# Patient Record
Sex: MALE | Race: WHITE | Employment: OTHER | RURAL
[De-identification: names, ages, dates, MRNs, and addresses within clinical notes are randomized per-mention and may not be internally consistent; named-entity substitution may affect disease eponyms.]

---

## 2017-02-06 ENCOUNTER — OFFICE VISIT (OUTPATIENT)
Dept: FAMILY MEDICINE CLINIC | Age: 72
End: 2017-02-06

## 2017-02-06 VITALS
SYSTOLIC BLOOD PRESSURE: 170 MMHG | DIASTOLIC BLOOD PRESSURE: 80 MMHG | HEART RATE: 62 BPM | WEIGHT: 225 LBS | RESPIRATION RATE: 12 BRPM | OXYGEN SATURATION: 98 % | BODY MASS INDEX: 32.21 KG/M2 | HEIGHT: 70 IN | TEMPERATURE: 95.5 F

## 2017-02-06 DIAGNOSIS — N40.0 BENIGN PROSTATIC HYPERPLASIA WITHOUT LOWER URINARY TRACT SYMPTOMS, UNSPECIFIED MORPHOLOGY: ICD-10-CM

## 2017-02-06 DIAGNOSIS — R73.9 ELEVATED SERUM GLUCOSE: ICD-10-CM

## 2017-02-06 DIAGNOSIS — Z85.038 HISTORY OF COLON CANCER: ICD-10-CM

## 2017-02-06 DIAGNOSIS — I10 ESSENTIAL HYPERTENSION: Primary | ICD-10-CM

## 2017-02-06 DIAGNOSIS — E78.5 HYPERLIPIDEMIA, UNSPECIFIED HYPERLIPIDEMIA TYPE: ICD-10-CM

## 2017-02-06 RX ORDER — KETOCONAZOLE 20 MG/G
CREAM TOPICAL DAILY
COMMUNITY
End: 2017-09-05 | Stop reason: SDUPTHER

## 2017-02-06 RX ORDER — TAMSULOSIN HYDROCHLORIDE 0.4 MG/1
0.4 CAPSULE ORAL DAILY
COMMUNITY
End: 2017-10-03 | Stop reason: SDUPTHER

## 2017-02-06 RX ORDER — LISINOPRIL 40 MG/1
40 TABLET ORAL DAILY
Qty: 30 TAB | Refills: 3 | Status: SHIPPED | OUTPATIENT
Start: 2017-02-06 | End: 2017-03-08 | Stop reason: SDUPTHER

## 2017-02-06 NOTE — PATIENT INSTRUCTIONS
Continue current medications  Restart lisinopril    Work on diet and exercise    Lab work this week    Keep planned follow up visit

## 2017-02-06 NOTE — PROGRESS NOTES
Sharon Sinclair is a 66 y.o. male who presents to the office today with the following:  Chief Complaint   Patient presents with    Hypertension     recent elevation       No Known Allergies    Current Outpatient Prescriptions   Medication Sig    tamsulosin (FLOMAX) 0.4 mg capsule Take 0.4 mg by mouth daily.  ketoconazole (NIZORAL) 2 % topical cream Apply  to affected area daily.  lisinopril (PRINIVIL, ZESTRIL) 40 mg tablet Take 1 Tab by mouth daily. Indications: hypertension    aspirin 81 mg tablet Take 81 mg by mouth daily.  loperamide (IMMODIUM) 2 mg tablet Take 4 mg by mouth Before breakfast, lunch, and dinner. And with snacks     lovastatin (MEVACOR) 20 mg tablet Take 20 mg by mouth nightly. Indications: HYPERCHOLESTEROLEMIA    ascorbic acid (VITAMIN C) 1,000 mg tablet Take 1,000 mg by mouth as needed. No current facility-administered medications for this visit. Past Medical History   Diagnosis Date    Cancer [facility]) 2009     colon    Diabetes ([facility] [address]     Hypertension        Past Surgical History   Procedure Laterality Date    Hx other surgical       vasectomy    Pr abdomen surgery proc unlisted  2009     ileostomy    Hx gi  2/11/13      REVISION / REPAIR ILEOSTOMY HERNIA       History   Smoking Status    Never Smoker   Smokeless Tobacco    Never Used       No family history on file. History of Present Illness:  Patient here for initial visit, to establish care in my practice and for ongoing medical follow-up    Patient with a history of hypertension. He used to be on Norvasc and lisinopril. He had lost significant weight at that time. He began to experience some hypotension. He discussed this with his previous physician and they discontinued the medications. His blood pressure did well for several years but now over the last 6 months he has been gaining some weight and has been noticing his blood pressure has been going higher. His blood pressure is elevated today. He has no cardiac complaints. No history of renal disease, CAD. He has a history of hyperlipidemia. He was on Mevacor. He again tells me he had lost weight and his previous physician discontinued his medications. He has gained weight again. We need to check this. He has had a history of hyperglycemia/borderline diabetes. He was on Glucotrol. With the weight loss he began to experience hypoglycemia. He has been fine since stopping the medication. We do need to recheck lab work to see if he needs additional medication    He has a history of colon cancer. He had this resected over 10 years ago. He was told the resection was complete and he did not need chemotherapy. He apparently followed up with his surgeon on one occasion to have a repeat colonoscopy but they were unable to perform this successfully through the ileostomy. He was told at that time he did not need follow-up scopes. He would be willing to do Hemoccult testing    He does have BPH. He has been on Flomax from his previous physician. This seems to work well for him. Because of his previous surgery we cannot do rectal exams he tells me. I will check a PSA. Patient does have elevated BMI as noted    His past medical history is otherwise negative. He has had one pneumonia shot.   He declines a second pneumonia shot, flu shot, shingles shot, tetanus booster      Review of Systems:      Review of systems negative except as noted above    Physical Exam:  Visit Vitals    /80    Pulse 62    Temp 95.5 °F (35.3 °C) (Temporal)    Resp 12    Ht 5' 10\" (1.778 m)    Wt 225 lb (102.1 kg)    SpO2 98%    BMI 32.28 kg/m2     Vitals:    02/06/17 1541 02/06/17 1601   BP: 181/85 170/80   BP 1 Location: Right arm    BP Patient Position: Sitting    Pulse: 62    Resp: 12    Temp: 95.5 °F (35.3 °C)    TempSrc: Temporal    SpO2: 98%    Weight: 225 lb (102.1 kg)    Height: 5' 10\" (1.778 m)      Patient no acute distress vital signs repeated and as above  Head is normocephalic. He had some seborrheic keratoses noted on the scalp  External ears normal.  Ear canals normal TMs were clear. Hearing grossly normal  Eyes PERRLA. EOMs full. Sclera clear. He has seen his eye doctor recently. He has had cataract surgery  Nose within normal limits. Nares  normal.  No significant congestion  OP mucosa normal, no obvious lesions. Pharynx normal.  No erythema or exudate. Structures midline. Good dental repair  Neck no nodes no masses no bruits  Chest was clear no wheezes rhonchi or rales. Good air exchange  Cor regular rate and rhythm no S3-S4 no murmurs  Abdomen mildly obese no HSM no masses soft and was nontender. Ileostomy in place. Extremities no edema. Nontender. Good range of motion  Gait and gross neurologic exam were normal    EKG      Assessment/Plan:  1. Essential hypertension  Elevated today. Patient willing to restart medication. We will begin with lisinopril. .  Patient would like his lab work done fasting which is reasonable. He agrees to come back in later on this week. I will see him back in 1 month and to continue to adjust his medication  - lisinopril (PRINIVIL, ZESTRIL) 40 mg tablet; Take 1 Tab by mouth daily. Indications: hypertension  Dispense: 30 Tab; Refill: 3  - METABOLIC PANEL, BASIC; Future  - URINALYSIS W/ RFLX MICROSCOPIC; Future    2. Elevated serum glucose    - HEMOGLOBIN A1C WITH EAG; Future  - MICROALBUMIN, RANDOM URINE; Future    3. Hyperlipidemia, unspecified hyperlipidemia type    - LIPID PANEL; Future  - AST; Future  - ALT; Future  - TSH; Future    4. Benign prostatic hyperplasia without lower urinary tract symptoms, unspecified morphology  We will continue current medications and check PSA  - PROSTATE SPECIFIC AG; Future    5. History of colon cancer  Patient states he has decided not to have his ileostomy repaired  - FECAL BLOOD SCRN IMMUNOASSAY    6.  BMI 32.0-32.9,adult  Importance of diet and exercise stressed    Patient Instructions   Continue current medications  Restart lisinopril    Work on diet and exercise    Lab work this week    Keep planned follow up visit         Continue current therapy plan except for indicated above. Verbal and written instructions (see AVS) provided.  Patient expresses understanding of diagnosis and treatment plan. Follow-up Disposition:  Return in about 4 weeks (around 3/6/2017). Katherine Parks.  Emma Paul MD

## 2017-02-06 NOTE — MR AVS SNAPSHOT
Visit Information Date & Time Provider Department Dept. Phone Encounter #  
 2/6/2017  3:00 PM Johnathon Dorantes MD [address] [place] [address] [phone] [id number] Follow-up Instructions Return in about 4 weeks (around 3/6/2017). Upcoming Health Maintenance Date Due FOBT Q 1 YEAR AGE 50-75 9/5/1994 ZOSTER VACCINE AGE 60> 9/5/2004 GLAUCOMA SCREENING Q2Y 9/5/2009 MEDICARE YEARLY EXAM 9/5/2009 DTaP/Tdap/Td series (2 - Td) 2/6/2027 Allergies as of 2/6/2017  Review Complete On: 2/6/2017 By: Ana Ferrara LPN No Known Allergies Current Immunizations  Reviewed on 2/11/2013 Name Date Influenza Vaccine 10/11/2012 Pneumococcal Polysaccharide (PPSV-23) 2/12/2013 12:05 PM  
  
 Not reviewed this visit You Were Diagnosed With   
  
 Codes Comments Essential hypertension    -  Primary ICD-10-CM: I10 
ICD-9-CM: 401.9 Elevated serum glucose     ICD-10-CM: R73.9 ICD-9-CM: 790.29 Hyperlipidemia, unspecified hyperlipidemia type     ICD-10-CM: E78.5 ICD-9-CM: 272.4 Benign prostatic hyperplasia without lower urinary tract symptoms, unspecified morphology     ICD-10-CM: N40.0 ICD-9-CM: 600.00 History of colon cancer     ICD-10-CM: Z85.038 
ICD-9-CM: V10.05 BMI 32.0-32.9,adult     ICD-10-CM: H83.44 
ICD-9-CM: V85.32 Vitals BP Pulse Temp Resp Height(growth percentile) Weight(growth percentile) 170/80 62 95.5 °F (35.3 °C) (Temporal) 12 5' 10\" (1.778 m) 225 lb (102.1 kg) SpO2 BMI Smoking Status 98% 32.28 kg/m2 Never Smoker Vitals History BMI and BSA Data Body Mass Index Body Surface Area  
 32.28 kg/m 2 2.25 m 2 Preferred Pharmacy Pharmacy Name Phone [facility] [address] [place], [address] Your Updated Medication List  
  
   
This list is accurate as of: 2/6/17  4:17 PM.  Always use your most recent med list.  
  
  
  
  
 aspirin 81 mg tablet Take 81 mg by mouth daily. ketoconazole 2 % topical cream  
Commonly known as:  NIZORAL Apply  to affected area daily. lisinopril 40 mg tablet Commonly known as:  Rebekah Primmer Take 1 Tab by mouth daily. Indications: hypertension  
  
 loperamide 2 mg tablet Commonly known as:  IMMODIUM Take 4 mg by mouth Before breakfast, lunch, and dinner. And with snacks  
  
 lovastatin 20 mg tablet Commonly known as:  MEVACOR Take 20 mg by mouth nightly. Indications: HYPERCHOLESTEROLEMIA  
  
 tamsulosin 0.4 mg capsule Commonly known as:  FLOMAX Take 0.4 mg by mouth daily. VITAMIN C 1,000 mg tablet Generic drug:  ascorbic acid (vitamin C) Take 1,000 mg by mouth as needed. Prescriptions Sent to Pharmacy Refills  
 lisinopril (PRINIVIL, ZESTRIL) 40 mg tablet 3 Sig: Take 1 Tab by mouth daily. Indications: hypertension Class: Normal  
 Pharmacy: [facility] [address]  Ph #: [phone] Route: Oral  
  
We Performed the Following FECAL BLOOD SCRN IMMUNOASSAY [id number] [facility]] Follow-up Instructions Return in about 4 weeks (around 3/6/2017). To-Do List   
 03/06/2017 Lab:  PSA DIAGNOSTIC (PROSTATIC SPECIFIC AG) 03/08/2017 Lab:  ALT   
  
 03/08/2017 Lab:  AST   
  
 03/08/2017 Lab:  HEMOGLOBIN A1C WITH EAG   
  
 03/08/2017 Lab:  LIPID PANEL   
  
 03/08/2017 Lab:  METABOLIC PANEL, BASIC   
  
 03/08/2017 Lab:  MICROALBUMIN, RANDOM URINE   
  
 03/08/2017 Lab:  TSH   
  
 03/08/2017 Lab:  URINALYSIS W/ RFLX MICROSCOPIC Patient Instructions Continue current medications Restart lisinopril Work on diet and exercise Lab work this week Keep planned follow up visit Introducing [facility]!    
 [employer] introduces [employer] patient portal. Now you can access parts of your medical record, email your doctor's office, and request medication refills online. 1. In your internet browser, go to [email or web address] 2. Click on the First Time User? Click Here link in the Sign In box. You will see the New Member Sign Up page. 3. Enter your [email or web address] Access Code exactly as it appears below. You will not need to use this code after youve completed the sign-up process. If you do not sign up before the expiration date, you must request a new code. · [email or web address] Access Code: [email or web address] Expires: 5/7/2017  3:02 PM 
 
4. Enter the last four digits of your Social Security Number (xxxx) and Date of Birth (mm/dd/yyyy) as indicated and click Submit. You will be taken to the next sign-up page. 5. Create a [email or web address] ID. This will be your [email or web address] login ID and cannot be changed, so think of one that is secure and easy to remember. 6. Create a [email or web address] password. You can change your password at any time. 7. Enter your Password Reset Question and Answer. This can be used at a later time if you forget your password. 8. Enter your e-mail address. You will receive e-mail notification when new information is available in [address] 9. Click Sign Up. You can now view and download portions of your medical record. 10. Click the Download Summary menu link to download a portable copy of your medical information. If you have questions, please visit the Frequently Asked Questions section of the [email or web address] website. Remember, [email or web address] is NOT to be used for urgent needs. For medical emergencies, dial 911. Now available from your iPhone and Android! Please provide this summary of care documentation to your next provider. Your primary care clinician is listed as Trenda Wiggins. If you have any questions after today's visit, please call [phone].

## 2017-02-10 ENCOUNTER — CLINICAL SUPPORT (OUTPATIENT)
Dept: FAMILY MEDICINE CLINIC | Age: 72
End: 2017-02-10

## 2017-02-10 DIAGNOSIS — E78.5 HYPERLIPIDEMIA, UNSPECIFIED HYPERLIPIDEMIA TYPE: ICD-10-CM

## 2017-02-10 DIAGNOSIS — N40.0 BENIGN PROSTATIC HYPERPLASIA WITHOUT LOWER URINARY TRACT SYMPTOMS, UNSPECIFIED MORPHOLOGY: ICD-10-CM

## 2017-02-10 DIAGNOSIS — I10 ESSENTIAL HYPERTENSION: ICD-10-CM

## 2017-02-10 DIAGNOSIS — Z85.038 HISTORY OF COLON CANCER: ICD-10-CM

## 2017-02-10 DIAGNOSIS — R73.9 ELEVATED SERUM GLUCOSE: ICD-10-CM

## 2017-02-12 LAB
ALT SERPL-CCNC: 12 IU/L (ref 0–44)
APPEARANCE UR: CLEAR
AST SERPL-CCNC: 12 IU/L (ref 0–40)
BILIRUB UR QL STRIP: NEGATIVE
BUN SERPL-MCNC: 10 MG/DL (ref 8–27)
BUN/CREAT SERPL: 11 (ref 10–22)
CALCIUM SERPL-MCNC: 9.1 MG/DL (ref 8.6–10.2)
CHLORIDE SERPL-SCNC: 104 MMOL/L (ref 96–106)
CHOLEST SERPL-MCNC: 216 MG/DL (ref 100–199)
CO2 SERPL-SCNC: 26 MMOL/L (ref 18–29)
COLOR UR: YELLOW
CREAT SERPL-MCNC: 0.87 MG/DL (ref 0.76–1.27)
EST. AVERAGE GLUCOSE BLD GHB EST-MCNC: 134 MG/DL
GLUCOSE SERPL-MCNC: 125 MG/DL (ref 65–99)
GLUCOSE UR QL: NEGATIVE
HBA1C MFR BLD: 6.3 % (ref 4.8–5.6)
HDLC SERPL-MCNC: 40 MG/DL
HEMOCCULT STL QL IA: NEGATIVE
HGB UR QL STRIP: NEGATIVE
INTERPRETATION, 910389: NORMAL
KETONES UR QL STRIP: NEGATIVE
LDLC SERPL CALC-MCNC: 149 MG/DL (ref 0–99)
LEUKOCYTE ESTERASE UR QL STRIP: NEGATIVE
MICRO URNS: NORMAL
MICROALBUMIN UR-MCNC: 8.9 UG/ML
NITRITE UR QL STRIP: NEGATIVE
PH UR STRIP: 5 [PH] (ref 5–7.5)
POTASSIUM SERPL-SCNC: 4.3 MMOL/L (ref 3.5–5.2)
PROT UR QL STRIP: NEGATIVE
PSA SERPL-MCNC: 1 NG/ML (ref 0–4)
SODIUM SERPL-SCNC: 143 MMOL/L (ref 134–144)
SP GR UR: 1.01 (ref 1–1.03)
TRIGL SERPL-MCNC: 133 MG/DL (ref 0–149)
UROBILINOGEN UR STRIP-MCNC: 0.2 MG/DL (ref 0.2–1)
VLDLC SERPL CALC-MCNC: 27 MG/DL (ref 5–40)

## 2017-02-13 RX ORDER — ATORVASTATIN CALCIUM 10 MG/1
10 TABLET, FILM COATED ORAL DAILY
Qty: 30 TAB | Refills: 1 | Status: SHIPPED | OUTPATIENT
Start: 2017-02-13 | End: 2017-03-13 | Stop reason: SDUPTHER

## 2017-02-13 NOTE — PROGRESS NOTES
Labs reviewed. Chemistry panel, liver functions, stool test, prostate test, urinalysis all acceptable  He should continue his current medications  Sugars consistent with borderline diabetes with a glycohemoglobin of 6.3  Lipids elevated with a cholesterol of 216 and an LDL of 149.   Too high for him    Would recommend restarting a statin  Best choice for him would be atorvastatin 10 mg daily  Medication is been called to pharmacy  Patient to work on his diet and exercise  Keep his plan follow-up with me next month and I will be ordering follow-up lab work at that time  Notify me with any problems or questions in the interim

## 2017-03-08 ENCOUNTER — OFFICE VISIT (OUTPATIENT)
Dept: FAMILY MEDICINE CLINIC | Age: 72
End: 2017-03-08

## 2017-03-08 VITALS
BODY MASS INDEX: 32.5 KG/M2 | HEART RATE: 62 BPM | WEIGHT: 227 LBS | RESPIRATION RATE: 12 BRPM | HEIGHT: 70 IN | DIASTOLIC BLOOD PRESSURE: 81 MMHG | SYSTOLIC BLOOD PRESSURE: 163 MMHG | OXYGEN SATURATION: 99 % | TEMPERATURE: 97.5 F

## 2017-03-08 DIAGNOSIS — I10 ESSENTIAL HYPERTENSION: ICD-10-CM

## 2017-03-08 DIAGNOSIS — Z71.89 ADVANCE CARE PLANNING: Primary | ICD-10-CM

## 2017-03-08 DIAGNOSIS — K43.2 INCISIONAL HERNIA, WITHOUT OBSTRUCTION OR GANGRENE: ICD-10-CM

## 2017-03-08 DIAGNOSIS — Z13.31 SCREENING FOR DEPRESSION: ICD-10-CM

## 2017-03-08 DIAGNOSIS — E78.5 HYPERLIPIDEMIA, UNSPECIFIED HYPERLIPIDEMIA TYPE: ICD-10-CM

## 2017-03-08 DIAGNOSIS — Z00.00 MEDICARE ANNUAL WELLNESS VISIT, INITIAL: ICD-10-CM

## 2017-03-08 DIAGNOSIS — R73.9 ELEVATED SERUM GLUCOSE: ICD-10-CM

## 2017-03-08 RX ORDER — LISINOPRIL 40 MG/1
40 TABLET ORAL DAILY
Qty: 90 TAB | Refills: 1 | Status: SHIPPED | OUTPATIENT
Start: 2017-03-08 | End: 2017-05-24 | Stop reason: SDUPTHER

## 2017-03-08 RX ORDER — AMLODIPINE BESYLATE 5 MG/1
5 TABLET ORAL DAILY
Qty: 90 TAB | Refills: 1 | Status: SHIPPED | OUTPATIENT
Start: 2017-03-08 | End: 2017-05-24 | Stop reason: SDUPTHER

## 2017-03-08 NOTE — PROGRESS NOTES
Melody William is a 66 y.o. male and presents for annual Medicare Wellness Visit. Problem List: Reviewed with patient and discussed risk factors. Patient Active Problem List   Diagnosis Code    [facility] K[facility]    Essential hypertension I10    Elevated serum glucose R73.9    Hyperlipidemia E78.5    Benign prostatic hyperplasia without lower urinary tract symptoms N40.0    History of colon cancer Z85.038    BMI 32.0-32.9,adult Z68.32       Current medical providers:  Patient Care Team:  Julius Yeh MD as PCP - General (Family Practice)  Julius Yeh MD (Family Practice)    PSH: Reviewed with patient  Past Surgical History:   Procedure Laterality Date    ABDOMEN SURGERY PROC UNLISTED  2009    ileostomy    HX GI  2/11/13     REVISION / REPAIR ILEOSTOMY HERNIA    HX OTHER SURGICAL      vasectomy        SH: Reviewed with patient  Social History   Substance Use Topics    Smoking status: Never Smoker    Smokeless tobacco: Never Used    Alcohol use Yes      Comment: very rare       FH: Reviewed with patient  No family history on file. Medications/Allergies: Reviewed with patient  Current Outpatient Prescriptions on File Prior to Visit   Medication Sig Dispense Refill    atorvastatin (LIPITOR) 10 mg tablet Take 1 Tab by mouth daily. 30 Tab 1    tamsulosin (FLOMAX) 0.4 mg capsule Take 0.4 mg by mouth daily.  ketoconazole (NIZORAL) 2 % topical cream Apply  to affected area daily.  lisinopril (PRINIVIL, ZESTRIL) 40 mg tablet Take 1 Tab by mouth daily. Indications: hypertension 30 Tab 3    aspirin 81 mg tablet Take 81 mg by mouth daily.  loperamide (IMMODIUM) 2 mg tablet Take 4 mg by mouth Before breakfast, lunch, and dinner. And with snacks       ascorbic acid (VITAMIN C) 1,000 mg tablet Take 1,000 mg by mouth as needed. No current facility-administered medications on file prior to visit. No Known Allergies    Objective:   There were no vitals taken for this visit. There is no height or weight on file to calculate BMI. Assessment of cognitive impairment: Alert and oriented x 3    Depression Screen:   PHQ 2 / 9, over the last two weeks 3/8/2017   Little interest or pleasure in doing things Not at all   Feeling down, depressed or hopeless Not at all   Total Score PHQ 2 0       Fall Risk Assessment:    Fall Risk Assessment, last 12 mths 3/8/2017   Able to walk? Yes   Fall in past 12 months? No       Functional Ability:   Does the patient exhibit a steady gait? yes   How long did it take the patient to get up and walk from a sitting position? 2sec     Is the patient self reliant?  (ie can do own laundry, meals, household chores)  yes     Does the patient handle his/her own medications? yes     Does the patient handle his/her own money? yes     Is the patients home safe (ie good lighting, handrails on stairs and bath, etc.)? yes     Did you notice or did patient express any hearing difficulties? no     Did you notice or did patient express any vision difficulties?   no     Were distance and reading eye charts used? no       Advance Care Planning:   Patient was offered the opportunity to discuss advance care planning:  yes     Does patient have an Advance Directive:  yes   If no, did you provide information on Caring Connections? yes       Plan:      No orders of the defined types were placed in this encounter. Health Maintenance   Topic Date Due    ZOSTER VACCINE AGE 60>  09/05/2004    MEDICARE YEARLY EXAM  09/05/2009    FOBT Q 1 YEAR AGE 50-75  02/10/2018    GLAUCOMA SCREENING Q2Y  02/06/2019    DTaP/Tdap/Td series (2 - Td) 02/06/2027    Pneumococcal 65+ Low/Medium Risk  Addressed    INFLUENZA AGE 9 TO ADULT  Addressed       *Patient verbalized understanding and agreement with the plan. A copy of the After Visit Summary with personalized health plan was given to the patient today.         Patient will drop by a copy of his ACP and DNR. Patient appears up to date on HM Due.

## 2017-03-08 NOTE — ACP (ADVANCE CARE PLANNING)
Advance Care Planning (ACP) Provider Conversation Snapshot    Date of ACP Conversation: 03/08/17  Persons included in Conversation:  patient  Length of ACP Conversation in minutes:  <16 minutes (Non-Billable)

## 2017-03-08 NOTE — PATIENT INSTRUCTIONS
Schedule of Personalized Health Plan  (Provide Copy to Patient)  The best way to stay healthy is to live a healthy lifestyle. A healthy lifestyle includes regular exercise, eating a well-balanced diet, keeping a healthy weight and not smoking. Regular physical exams and screening tests are another important way to take care of yourself. Preventive exams provided by health care providers can find health problems early when treatment works best and can keep you from getting certain diseases or illnesses. Preventive services include exams, lab tests, screenings, shots, monitoring and information to help you take care of your own health. All people over 65 should have a pneumonia shot. Pneumonia shots are usually only needed once in a lifetime unless your doctor decides differently. All people over 65 should have a yearly flu shot. People over 65 are at medium to high risk for Hepatitis B. Three shots are needed for complete protection. In addition to your physical exam, some screening tests are recommended:    Bone mass measurement (dexa scan) is recommended every two years if you have certain risk factors, such as personal history of vertebral fracture or chronic steroid medication use    Diabetes Mellitus screening is recommended every year. Glaucoma is an eye disease caused by high pressure in the eye. An eye exam is recommended every year. Cardiovascular screening tests that check your cholesterol and other blood fat (lipid) levels are recommended every five years. Colorectal Cancer screening tests help to find pre-cancerous polyps (growths in the colon) so they can be removed before they turn into cancer. Tests ordered for screening depend on your personal and family history risk factors.     Screening for Prostate Cancer is recommended yearly with a digital rectal exam and/or a PSA test    Here is a list of your current Health Maintenance items with a due date:  Health Maintenance Topic Date Due    ZOSTER VACCINE AGE 60>  09/05/2004    MEDICARE YEARLY EXAM  09/05/2009    FOBT Q 1 YEAR AGE 50-75  02/10/2018    GLAUCOMA SCREENING Q2Y  02/06/2019    DTaP/Tdap/Td series (2 - Td) 02/06/2027    Pneumococcal 65+ Low/Medium Risk  Addressed    INFLUENZA AGE 9 TO ADULT  Addressed       Continue current medications  Add amlodipine one daily    Work on diet and exercise    Lab work today    Keep planned follow up visit     Call if you want to see surgeon about hernia

## 2017-03-08 NOTE — ACP (ADVANCE CARE PLANNING)
Advance Care Planning (ACP) Provider 2 [facility] (ACP) Provider Conversation Snapshot    Date of ACP Conversation: 03/08/17  Persons included in Conversation:  patient  Length of ACP Conversation in minutes:  <16 minutes (Non-Billable)

## 2017-03-08 NOTE — MR AVS SNAPSHOT
Visit Information Date & Time Provider Department Dept. Phone Encounter #  
 3/8/2017  9:00 AM Alejo Aaron MD [address] [id number] Follow-up Instructions Return in about 1 month (around 4/8/2017). Upcoming Health Maintenance Date Due  
 MEDICARE YEARLY EXAM 9/5/2009 FOBT Q 1 YEAR AGE 50-75 2/10/2018 GLAUCOMA SCREENING Q2Y 2/6/2019 DTaP/Tdap/Td series (2 - Td) 2/6/2027 Allergies as of 3/8/2017  Review Complete On: 3/8/2017 By: Yumiko Burgos LPN No Known Allergies Current Immunizations  Reviewed on 2/11/2013 Name Date Influenza Vaccine 10/11/2012 Pneumococcal Polysaccharide (PPSV-23) 2/12/2013 12:05 PM  
  
 Not reviewed this visit You Were Diagnosed With   
  
 Codes Comments Advance care planning    -  Primary ICD-10-CM: Z71.89 ICD-9-CM: V65.49 Medicare annual wellness visit, initial     ICD-10-CM: Z00.00 ICD-9-CM: V70.0 Screening for depression     ICD-10-CM: Z13.89 ICD-9-CM: V79.0 Essential hypertension     ICD-10-CM: I10 
ICD-9-CM: 401.9 Elevated serum glucose     ICD-10-CM: R73.9 ICD-9-CM: 790.29 Hyperlipidemia, unspecified hyperlipidemia type     ICD-10-CM: E78.5 ICD-9-CM: 272.4 Incisional hernia, without obstruction or gangrene     ICD-10-CM: K43.2 ICD-9-CM: 553.21 Vitals BP Pulse Temp Resp Height(growth percentile) 163/81 (BP 1 Location: Right arm, BP Patient Position: Sitting) 62 97.5 °F (36.4 °C) (Temporal) 12 5' 10\" (1.778 m) Weight(growth percentile) SpO2 BMI Smoking Status 227 lb (103 kg) 99% 32.57 kg/m2 Never Smoker BMI and BSA Data Body Mass Index Body Surface Area 32.57 kg/m 2 2.26 m 2 Preferred Pharmacy Pharmacy Name Phone [facility] [address], [address] Your Updated Medication List  
  
   
 This list is accurate as of: 3/8/17  9:55 AM.  Always use your most recent med list.  
  
  
  
  
 aspirin 81 mg tablet Take 81 mg by mouth daily. atorvastatin 10 mg tablet Commonly known as:  LIPITOR Take 1 Tab by mouth daily. ketoconazole 2 % topical cream  
Commonly known as:  NIZORAL Apply  to affected area daily. lisinopril 40 mg tablet Commonly known as:  Ora Bee Take 1 Tab by mouth daily. Indications: hypertension  
  
 loperamide 2 mg tablet Commonly known as:  IMMODIUM Take 4 mg by mouth Before breakfast, lunch, and dinner. And with snacks  
  
 tamsulosin 0.4 mg capsule Commonly known as:  FLOMAX Take 0.4 mg by mouth daily. VITAMIN C 1,000 mg tablet Generic drug:  ascorbic acid (vitamin C) Take 1,000 mg by mouth as needed. We Performed the Following ALT [id number] CPT(R)] AMB POC EKG ROUTINE W/ 12 LEADS, INTER & REP [18540 CPT(R)] AST [id number] CPT(R)] LIPID PANEL [29197 CPT(R)] METABOLIC PANEL, BASIC [15603 CPT(R)] [address] Meno nPicker [ [facility]] Follow-up Instructions Return in about 1 month (around 4/8/2017). Patient Instructions Schedule of Personalized Health Plan (Provide Copy to Patient) The best way to stay healthy is to live a healthy lifestyle. A healthy lifestyle includes regular exercise, eating a well-balanced diet, keeping a healthy weight and not smoking. Regular physical exams and screening tests are another important way to take care of yourself. Preventive exams provided by health care providers can find health problems early when treatment works best and can keep you from getting certain diseases or illnesses. Preventive services include exams, lab tests, screenings, shots, monitoring and information to help you take care of your own health. All people over 65 should have a pneumonia shot.  Pneumonia shots are usually only needed once in a lifetime unless your doctor decides differently. All people over 65 should have a yearly flu shot. People over 65 are at medium to high risk for Hepatitis B. Three shots are needed for complete protection. In addition to your physical exam, some screening tests are recommended: 
 
Bone mass measurement (dexa scan) is recommended every two years if you have certain risk factors, such as personal history of vertebral fracture or chronic steroid medication use Diabetes Mellitus screening is recommended every year. Glaucoma is an eye disease caused by high pressure in the eye. An eye exam is recommended every year. Cardiovascular screening tests that check your cholesterol and other blood fat (lipid) levels are recommended every five years. Colorectal Cancer screening tests help to find pre-cancerous polyps (growths in the colon) so they can be removed before they turn into cancer. Tests ordered for screening depend on your personal and family history risk factors. Screening for Prostate Cancer is recommended yearly with a digital rectal exam and/or a PSA test 
 
Here is a list of your current Health Maintenance items with a due date: 
Health Maintenance Topic Date Due  
 ZOSTER VACCINE AGE 60>  09/05/2004  MEDICARE YEARLY EXAM  09/05/2009  FOBT Q 1 YEAR AGE 50-75  02/10/2018  GLAUCOMA SCREENING Q2Y  02/06/2019  
 DTaP/Tdap/Td series (2 - Td) 02/06/2027  Pneumococcal 65+ Low/Medium Risk  Addressed  INFLUENZA AGE 9 TO ADULT  Addressed Continue current medications Add amlodipine one daily Work on diet and exercise Lab work today Keep planned follow up visit Call if you want to see surgeon about hernia Please provide this summary of care documentation to your next provider. Your primary care clinician is listed as Rahat Chisholm. If you have any questions after today's visit, please call [phone].

## 2017-03-09 LAB
ALT SERPL-CCNC: 16 IU/L (ref 0–44)
AST SERPL-CCNC: 17 IU/L (ref 0–40)
BUN SERPL-MCNC: 10 MG/DL (ref 8–27)
BUN/CREAT SERPL: 12 (ref 10–22)
CALCIUM SERPL-MCNC: 8.9 MG/DL (ref 8.6–10.2)
CHLORIDE SERPL-SCNC: 104 MMOL/L (ref 96–106)
CHOLEST SERPL-MCNC: 154 MG/DL (ref 100–199)
CO2 SERPL-SCNC: 26 MMOL/L (ref 18–29)
CREAT SERPL-MCNC: 0.81 MG/DL (ref 0.76–1.27)
GLUCOSE SERPL-MCNC: 121 MG/DL (ref 65–99)
HDLC SERPL-MCNC: 44 MG/DL
INTERPRETATION, 910389: NORMAL
LDLC SERPL CALC-MCNC: 84 MG/DL (ref 0–99)
POTASSIUM SERPL-SCNC: 4.8 MMOL/L (ref 3.5–5.2)
SODIUM SERPL-SCNC: 142 MMOL/L (ref 134–144)
TRIGL SERPL-MCNC: 128 MG/DL (ref 0–149)
VLDLC SERPL CALC-MCNC: 26 MG/DL (ref 5–40)

## 2017-03-09 NOTE — PROGRESS NOTES
Labs reviewed please notify patient  Chemistry panel stable and acceptable  Lipids much improved on Lipitor  Continue current medications  Work on diet  Keep planned follow-up

## 2017-03-13 DIAGNOSIS — E78.5 HYPERLIPIDEMIA, UNSPECIFIED HYPERLIPIDEMIA TYPE: ICD-10-CM

## 2017-03-13 RX ORDER — ATORVASTATIN CALCIUM 10 MG/1
10 TABLET, FILM COATED ORAL DAILY
Qty: 30 TAB | Refills: 1 | OUTPATIENT
Start: 2017-03-13

## 2017-03-13 RX ORDER — ATORVASTATIN CALCIUM 10 MG/1
10 TABLET, FILM COATED ORAL DAILY
Qty: 30 TAB | Refills: 6 | Status: SHIPPED | OUTPATIENT
Start: 2017-03-13 | End: 2017-08-19 | Stop reason: SDUPTHER

## 2017-03-13 NOTE — ACP (ADVANCE CARE PLANNING)
Advance Care Planning (ACP) Provider Conversation Snapshot    Date of ACP Conversation: 03/08/2017  Persons included in Conversation:  patient  Length of ACP Conversation in minutes:  <16 minutes (Non-Billable)

## 2017-04-05 ENCOUNTER — OFFICE VISIT (OUTPATIENT)
Dept: FAMILY MEDICINE CLINIC | Age: 72
End: 2017-04-05

## 2017-04-05 VITALS
DIASTOLIC BLOOD PRESSURE: 69 MMHG | HEART RATE: 56 BPM | OXYGEN SATURATION: 99 % | RESPIRATION RATE: 14 BRPM | HEIGHT: 70 IN | WEIGHT: 226.6 LBS | TEMPERATURE: 97.4 F | SYSTOLIC BLOOD PRESSURE: 136 MMHG | BODY MASS INDEX: 32.44 KG/M2

## 2017-04-05 DIAGNOSIS — I10 ESSENTIAL HYPERTENSION: Primary | ICD-10-CM

## 2017-04-05 DIAGNOSIS — E78.5 HYPERLIPIDEMIA, UNSPECIFIED HYPERLIPIDEMIA TYPE: ICD-10-CM

## 2017-04-05 NOTE — MR AVS SNAPSHOT
Visit Information Date & Time Provider Department Dept. Phone Encounter #  
 4/5/2017  8:30 AM Jamia Lockhart MD [address] [place] [address] [phone] [id number] Follow-up Instructions Return in about 3 months (around 7/5/2017). Upcoming Health Maintenance Date Due FOBT Q 1 YEAR AGE 50-75 2/10/2018 MEDICARE YEARLY EXAM 3/9/2018 GLAUCOMA SCREENING Q2Y 2/6/2019 DTaP/Tdap/Td series (2 - Td) 2/6/2027 Allergies as of 4/5/2017  Review Complete On: 4/5/2017 By: Jamia Lockhart MD  
 No Known Allergies Current Immunizations  Reviewed on 2/11/2013 Name Date Influenza Vaccine 10/11/2012 Pneumococcal Polysaccharide (PPSV-23) 2/12/2013 12:05 PM  
  
 Not reviewed this visit You Were Diagnosed With   
  
 Codes Comments Essential hypertension    -  Primary ICD-10-CM: I10 
ICD-9-CM: 401.9 Hyperlipidemia, unspecified hyperlipidemia type     ICD-10-CM: E78.5 ICD-9-CM: 272.4 Vitals BP Pulse Temp Resp Height(growth percentile) 136/69 (BP 1 Location: Right arm, BP Patient Position: Sitting) (!) 56 97.4 °F (36.3 °C) (Temporal) 14 5' 10\" (1.778 m) Weight(growth percentile) SpO2 BMI Smoking Status 226 lb 9.6 oz (102.8 kg) 99% 32.51 kg/m2 Never Smoker BMI and BSA Data Body Mass Index Body Surface Area 32.51 kg/m 2 2.25 m 2 Preferred Pharmacy Pharmacy Name Phone [address] [facility], [address] [place] [address] [address] [facility] Your Updated Medication List  
  
   
This list is accurate as of: 4/5/17  9:08 AM.  Always use your most recent med list. amLODIPine 5 mg tablet Commonly known as:  Schuyler Dheeraj Take 1 Tab by mouth daily. aspirin 81 mg tablet Take 81 mg by mouth daily. atorvastatin 10 mg tablet Commonly known as:  LIPITOR Take 1 Tab by mouth daily.   
  
 ketoconazole 2 % topical cream  
Commonly known as:  NIZORAL  
 Apply  to affected area daily. lisinopril 40 mg tablet Commonly known as:  Kwasi Headings Take 1 Tab by mouth daily. Indications: hypertension  
  
 loperamide 2 mg tablet Commonly known as:  IMMODIUM Take 4 mg by mouth Before breakfast, lunch, and dinner. And with snacks  
  
 tamsulosin 0.4 mg capsule Commonly known as:  FLOMAX Take 0.4 mg by mouth daily. VITAMIN C 1,000 mg tablet Generic drug:  ascorbic acid (vitamin C) Take 1,000 mg by mouth as needed. Follow-up Instructions Return in about 3 months (around 7/5/2017). Patient Instructions Continue current medications Follow up cardiology Work on diet and exercise Keep planned follow up visit Please provide this summary of care documentation to your next provider. Your primary care clinician is listed as Elidia Stovall. If you have any questions after today's visit, please call [phone].

## 2017-04-05 NOTE — PROGRESS NOTES
Earnestine Buchanan is a 66 y.o. male who presents to the office today with the following:  Chief Complaint   Patient presents with    Follow-up     1 mo, htn       No Known Allergies    Current Outpatient Prescriptions   Medication Sig    atorvastatin (LIPITOR) 10 mg tablet Take 1 Tab by mouth daily.  lisinopril (PRINIVIL, ZESTRIL) 40 mg tablet Take 1 Tab by mouth daily. Indications: hypertension    amLODIPine (NORVASC) 5 mg tablet Take 1 Tab by mouth daily.  tamsulosin (FLOMAX) 0.4 mg capsule Take 0.4 mg by mouth daily.  ketoconazole (NIZORAL) 2 % topical cream Apply  to affected area daily.  aspirin 81 mg tablet Take 81 mg by mouth daily.  loperamide (IMMODIUM) 2 mg tablet Take 4 mg by mouth Before breakfast, lunch, and dinner. And with snacks     ascorbic acid (VITAMIN C) 1,000 mg tablet Take 1,000 mg by mouth as needed. No current facility-administered medications for this visit. Past Medical History:   Diagnosis Date    Cancer [facility]) 2009    colon    Diabetes ([address]     Hypertension        Past Surgical History:   Procedure Laterality Date    ABDOMEN SURGERY PROC UNLISTED  2009    ileostomy    HX GI  2/11/13     REVISION / REPAIR ILEOSTOMY HERNIA    HX OTHER SURGICAL      vasectomy       History   Smoking Status    Never Smoker   Smokeless Tobacco    Never Used       No family history on file. History of Present Illness:  Patient here for  ongoing medical follow-up    Patient with a history of hypertension. He used to be on Norvasc and lisinopril. He had lost significant weight at that time. He began to experience some hypotension. He discussed this with his previous physician and they discontinued the medications. His blood pressure did well for several years but now over the last year he has been gaining some weight and has been noticing his blood pressure has been going higher. We did restart his lisinopril. Blood pressure improved but still elevated.   At the last visit I did restart his Norvasc. He is tolerating this. Blood pressure is improved. He gets systolics in the 118L when he checks them at [facility] ([facility]). EKG 3/17. Negative chest x-ray within the last couple of years. Recent basic medical profile normal.  He has never smoked. Patient previously denied any cardiac symptoms. Today he asked if he could have a handicap parking sticker. He states when he tries to walk from the peer up a steep hill to his car he gets quite short of breath. No chest pain or pressure. He is able to walk on a treadmill at the gym without symptoms. He feels his exercise intolerance has gotten worse over the years. It is quite likely weight related and deconditioning but given his cardiac risk factors I would like him to be seen by cardiology with consideration toward stress testing. Patient is willing. He is on aspirin therapy    He has a history of hyperlipidemia. He was on Mevacor. He again tells me he had lost weight and his previous physician discontinued his medications. He has gained weight again. Follow-up lab work showed cholesterol elevated at 216 with an LDL of 141 and normal LFTs. We did start him on Lipitor. Follow-up lab work much improved. LDL at goal with normal LFTs    He has had a history of hyperglycemia/borderline diabetes. He was on Glucotrol. With the weight loss he began to experience hypoglycemia. He has been fine since stopping the medication. Repeat labs revealed glycohemoglobin borderline at 6.3 but not diabetic. Negative spot urine for microalbumin. We do not need to start medication at this point but he is aware of the importance of diet and exercise    He has a history of colon cancer. He had this resected over 10 years ago. He was told the resection was complete and he did not need chemotherapy.   He apparently followed up with his surgeon on one occasion to have a repeat colonoscopy but they were unable to perform this successfully through the ileostomy. He was told at that time he did not need follow-up scopes. Hemoccult fit test was negative 2017. He does have a hernia around the ostomy. Apparently this was repaired once and recurred. He is thinking about seeing another surgeon to discuss repairing this again but does not want to at this point. Patient does note some small varicose veins in his legs which are worse when he stands for prolonged period of time. Most of the time they do not bother him. We did discuss foot elevation and support hose. Those complaints have actually improved    Patient did have multiple seborrheic keratoses on his face and scalp today. He does have BPH. He has been on Flomax from his previous physician. This seems to work well for him. Because of his previous surgery we cannot do rectal exams he tells me. PSA 2/17 normal 1.1    Patient does have elevated BMI as noted    His past medical history is otherwise negative. He has had one pneumonia shot. He declines a second pneumonia shot, flu shot, shingles shot, tetanus booster      Review of Systems:      Review of systems negative except as noted above    Physical Exam:  Visit Vitals    /69 (BP 1 Location: Right arm, BP Patient Position: Sitting)    Pulse (!) 56    Temp 97.4 °F (36.3 °C) (Temporal)    Resp 14    Ht 5' 10\" (1.778 m)    Wt 226 lb 9.6 oz (102.8 kg)    SpO2 99%    BMI 32.51 kg/m2     Vitals:    04/05/17 0827   BP: 136/69   BP 1 Location: Right arm   BP Patient Position: Sitting   Pulse: (!) 56   Resp: 14   Temp: 97.4 °F (36.3 °C)   TempSrc: Temporal   SpO2: 99%   Weight: 226 lb 9.6 oz (102.8 kg)   Height: 5' 10\" (1.778 m)     Patient no acute distress vital signs repeated and was 150/80 on my check  Head is normocephalic. He had some seborrheic keratoses noted on the scalp  External ears normal.  Eyes PERRLA. EOMs full. Sclera clear. He has seen his eye doctor recently.   He has had cataract surgery  Neck no nodes no masses no bruits  Chest was clear no wheezes rhonchi or rales. Good air exchange  Cor regular rate and rhythm no S3-S4 no murmurs  Abdomen mildly obese no HSM no masses soft and was nontender. Ileostomy in place. Patient did have a hernia around the ostomy. Nontender  Extremities no edema. Nontender. Good range of motion. Small varicose veins noted  Gait and gross neurologic exam were normal    EKG 2/17 normal sinus rhythm with some minimal nonspecific T-wave flattening lateral leads. Unchanged from 2013      1. Essential hypertension  Blood pressure improved. At goal at home and with nurse check today. Patient is going to continue his current medications. I will refer him to cardiology for consideration toward stress testing given his exercise intolerance and risk factors. He will notify me if his symptoms increase. I would like to see him back in 3 months for ongoing follow-up    2. Hyperlipidemia, unspecified hyperlipidemia type  Improved on Lipitor  . Augusta Halt Patient Instructions   Continue current medications  Follow up cardiology    Work on diet and exercise    Keep planned follow up visit         Continue current therapy plan except for indicated above. Verbal and written instructions (see AVS) provided.  Patient expresses understanding of diagnosis and treatment plan. Follow-up Disposition:  Return in about 3 months (around 7/5/2017). Briseida Harper.  Aba Collazo MD

## 2017-04-05 NOTE — PATIENT INSTRUCTIONS
Continue current medications  Follow up cardiology    Work on diet and exercise    Keep planned follow up visit

## 2017-05-03 ENCOUNTER — OFFICE VISIT (OUTPATIENT)
Dept: CARDIOLOGY CLINIC | Age: 72
End: 2017-05-03

## 2017-05-03 VITALS
DIASTOLIC BLOOD PRESSURE: 80 MMHG | SYSTOLIC BLOOD PRESSURE: 134 MMHG | HEART RATE: 62 BPM | HEIGHT: 70 IN | WEIGHT: 228 LBS | RESPIRATION RATE: 18 BRPM | BODY MASS INDEX: 32.64 KG/M2 | OXYGEN SATURATION: 98 %

## 2017-05-03 DIAGNOSIS — R06.02 SOB (SHORTNESS OF BREATH): Primary | ICD-10-CM

## 2017-05-03 DIAGNOSIS — K43.5 PARA-ILEOSTOMY HERNIA (HCC): ICD-10-CM

## 2017-05-03 DIAGNOSIS — Z85.038 HISTORY OF COLON CANCER: ICD-10-CM

## 2017-05-03 DIAGNOSIS — E11.9 DIABETES MELLITUS TYPE 2, DIET-CONTROLLED (HCC): ICD-10-CM

## 2017-05-03 DIAGNOSIS — K94.19 PARA-ILEOSTOMY HERNIA (HCC): ICD-10-CM

## 2017-05-03 DIAGNOSIS — I10 ESSENTIAL HYPERTENSION: ICD-10-CM

## 2017-05-03 DIAGNOSIS — E78.5 HYPERLIPIDEMIA, UNSPECIFIED HYPERLIPIDEMIA TYPE: ICD-10-CM

## 2017-05-03 RX ORDER — NAPROXEN SODIUM 220 MG
220 TABLET ORAL
COMMUNITY
End: 2019-10-04

## 2017-05-03 NOTE — PROGRESS NOTES
Verified patient with two patient identifiers. Medications reviewed/approved by Dr. Carmelita Ag. Verbal from Dr. Carmelita Ag to remove the medications that were deleted during the visit.

## 2017-05-03 NOTE — PROGRESS NOTES
Tommy Freed is a 66 y.o. male is here for cardiac evaluation. Hx mild DM II (diet rx), hypertension, dyslipidemia, colon ca s/p resection (no chemo), without known prior cardiac hx. Lives on sailboat 8 mos/yr. Sx of mild BARBOSA. Lifelong non-smoker. No FH CAD. Physically active, retired . No prior cardiac w/u. The patient denies chest pain, orthopnea, PND, LE edema, palpitations, syncope, presyncope or fatigue. Patient Active Problem List    Diagnosis Date Noted    Incisional hernia, without obstruction or gangrene 03/08/2017    Essential hypertension 02/06/2017    Elevated serum glucose 02/06/2017    Hyperlipidemia 02/06/2017    Benign prostatic hyperplasia without lower urinary tract symptoms 02/06/2017    History of colon cancer 02/06/2017    BMI 32.0-32.9,adult 02/06/2017    Para-ileostomy hernia ([facility] [address] 02/11/2013      Jarod Hollis MD  Past Medical History:   Diagnosis Date    [facility]) 2009    colon    Diabetes ([facility] [address]     Hypertension       Past Surgical History:   Procedure Laterality Date    ABDOMEN SURGERY PROC UNLISTED  2009    ileostomy    HX GI  2/11/13     REVISION / REPAIR ILEOSTOMY HERNIA    HX OTHER SURGICAL      vasectomy     No Known Allergies   No family history on file. Social History     Social History    Marital status: SINGLE     Spouse name: N/A    Number of children: N/A    Years of education: N/A     Occupational History    Not on file. Social History Main Topics    Smoking status: Never Smoker    Smokeless tobacco: Never Used    Alcohol use Yes      Comment: very rare    Drug use: No    Sexual activity: Not on file     Other Topics Concern    Not on file     Social History Narrative      Current Outpatient Prescriptions   Medication Sig    naproxen sodium (NAPROSYN) 220 mg tablet Take 220 mg by mouth daily as needed.  atorvastatin (LIPITOR) 10 mg tablet Take 1 Tab by mouth daily.     lisinopril (PRINIVIL, ZESTRIL) 40 mg tablet Take 1 Tab by mouth daily. Indications: hypertension    amLODIPine (NORVASC) 5 mg tablet Take 1 Tab by mouth daily.  tamsulosin (FLOMAX) 0.4 mg capsule Take 0.4 mg by mouth daily.  ketoconazole (NIZORAL) 2 % topical cream Apply  to affected area daily.  aspirin 81 mg tablet Take 81 mg by mouth daily.  loperamide (IMMODIUM) 2 mg tablet Take 4 mg by mouth Before breakfast, lunch, and dinner. And with snacks      No current facility-administered medications for this visit. Review of Symptoms:    CONST  No weight change. No fever, chills, sweats    ENT No visual changes, URI sx, sore throat    CV  See HPI   RESP  No cough, or sputum, wheezing. Also see HPI   GI  No abdominal pain or change in bowel habits. No heartburn or dysphagia. No melena or rectal bleeding.   No dysuria, urgency, frequency, hematuria   MSKEL  No joint pain, swelling. No muscle pain. SKIN  No rash or lesions. NEURO  No headache, syncope, or seizure. No weakness, loss of sensation, or paresthesias. PSYCH  No low mood or depression  No anxiety. HE/LYMPH  No easy bruising, abnormal bleeding, or enlarged glands.         Physical ExamPhysical Exam:    Visit Vitals    /90 (BP 1 Location: Left arm, BP Patient Position: Sitting)    Pulse 62    Resp 18    Ht 5' 10\" (1.778 m)    Wt 228 lb (103.4 kg)    SpO2 98%    BMI 32.71 kg/m2     Gen: NAD  HEENT:  PERRL, throat clear  Neck: no mass or thyromegaly, no JVD   Heart:  Regular,Nl S1S2,  no murmur, gallop or rub.   Lungs:  clear  Abdomen:   Soft, non-tender, bowel sounds are active.   Extremities:  No edema  Pulse: symmetric  Neuro: A&O times 3, WNL      Cardiographics    ECG: NSR, sinus jaqueline 57      Labs:   Lab Results   Component Value Date/Time    Sodium 142 03/08/2017 09:56 AM    Sodium 143 02/10/2017 08:51 AM    Sodium 142 02/14/2013 04:20 AM    Sodium 139 02/12/2013 03:40 AM    Sodium 142 02/04/2013 02:43 PM    Potassium 4.8 03/08/2017 09:56 AM    Potassium 4.3 02/10/2017 08:51 AM    Potassium 3.5 02/14/2013 04:20 AM    Potassium 4.1 02/12/2013 03:40 AM    Potassium 3.9 02/04/2013 02:43 PM    Chloride 104 03/08/2017 09:56 AM    Chloride 104 02/10/2017 08:51 AM    Chloride 109 02/14/2013 04:20 AM    Chloride 106 02/12/2013 03:40 AM    Chloride 108 02/04/2013 02:43 PM    CO2 26 03/08/2017 09:56 AM    CO2 26 02/10/2017 08:51 AM    CO2 27 02/14/2013 04:20 AM    CO2 26 02/12/2013 03:40 AM    CO2 25 02/04/2013 02:43 PM    Anion gap 6 02/14/2013 04:20 AM    Anion gap 7 02/12/2013 03:40 AM    Anion gap 9 02/04/2013 02:43 PM    Anion gap 6 03/31/2009 09:13 AM    Anion gap 13 03/30/2009 01:30 AM    Glucose 121 03/08/2017 09:56 AM    Glucose 125 02/10/2017 08:51 AM    Glucose 113 02/14/2013 04:20 AM    Glucose 110 02/12/2013 03:40 AM    Glucose 86 02/04/2013 02:43 PM    BUN 10 03/08/2017 09:56 AM    BUN 10 02/10/2017 08:51 AM    BUN 9 02/14/2013 04:20 AM    BUN 11 02/12/2013 03:40 AM    BUN 12 02/04/2013 02:43 PM    Creatinine 0.81 03/08/2017 09:56 AM    Creatinine 0.87 02/10/2017 08:51 AM    Creatinine 0.80 02/14/2013 04:20 AM    Creatinine 0.87 02/12/2013 03:40 AM    Creatinine 0.72 02/04/2013 02:43 PM    BUN/Creatinine ratio 12 03/08/2017 09:56 AM    BUN/Creatinine ratio 11 02/10/2017 08:51 AM    BUN/Creatinine ratio 11 02/14/2013 04:20 AM    BUN/Creatinine ratio 13 02/12/2013 03:40 AM    BUN/Creatinine ratio 17 02/04/2013 02:43 PM    GFR est  03/08/2017 09:56 AM    GFR est  02/10/2017 08:51 AM    GFR est AA >60 02/14/2013 04:20 AM    GFR est AA >60 02/12/2013 03:40 AM    GFR est AA >60 02/04/2013 02:43 PM    GFR est non-AA 89 03/08/2017 09:56 AM    GFR est non-AA 86 02/10/2017 08:51 AM    GFR est non-AA >60 02/14/2013 04:20 AM    GFR est non-AA >60 02/12/2013 03:40 AM    GFR est non-AA >60 02/04/2013 02:43 PM    Calcium 8.9 03/08/2017 09:56 AM    Calcium 9.1 02/10/2017 08:51 AM    Calcium 8.4 02/14/2013 04:20 AM    Calcium 8.3 02/12/2013 03:40 AM    Calcium 8.4 02/04/2013 02:43 PM    Bilirubin, total 0.9 02/12/2013 03:40 AM    Bilirubin, total 0.4 02/04/2013 02:43 PM    Bilirubin, total 0.4 03/30/2009 01:30 AM    Bilirubin, total 0.7 03/27/2009 04:00 AM    Bilirubin, total 0.7 03/26/2009 06:40 PM    AST (SGOT) 17 03/08/2017 09:56 AM    AST (SGOT) 12 02/10/2017 08:51 AM    AST (SGOT) 10 02/12/2013 03:40 AM    AST (SGOT) 18 02/04/2013 02:43 PM    AST (SGOT) 19 03/30/2009 01:30 AM    Alk. phosphatase 56 02/12/2013 03:40 AM    Alk. phosphatase 63 02/04/2013 02:43 PM    Alk. phosphatase 69 03/30/2009 01:30 AM    Alk. phosphatase 91 03/27/2009 04:00 AM    Alk.  phosphatase 102 03/26/2009 06:40 PM    Protein, total 5.4 02/12/2013 03:40 AM    Protein, total 6.6 02/04/2013 02:43 PM    Protein, total 5.4 03/30/2009 01:30 AM    Protein, total 6.5 03/27/2009 04:00 AM    Protein, total 7.3 03/26/2009 06:40 PM    Albumin 3.1 02/12/2013 03:40 AM    Albumin 3.7 02/04/2013 02:43 PM    Albumin 2.6 03/30/2009 01:30 AM    Albumin 3.2 03/27/2009 04:00 AM    Albumin 3.7 03/26/2009 06:40 PM    Globulin 2.3 02/12/2013 03:40 AM    Globulin 2.9 02/04/2013 02:43 PM    Globulin 2.8 03/30/2009 01:30 AM    Globulin 3.3 03/27/2009 04:00 AM    Globulin 3.6 03/26/2009 06:40 PM    A-G Ratio 1.3 02/12/2013 03:40 AM    A-G Ratio 1.3 02/04/2013 02:43 PM    A-G Ratio 0.9 03/30/2009 01:30 AM    A-G Ratio 1.0 03/27/2009 04:00 AM    A-G Ratio 1.0 03/26/2009 06:40 PM    ALT (SGPT) 16 03/08/2017 09:56 AM    ALT (SGPT) 12 02/10/2017 08:51 AM    ALT (SGPT) 18 02/12/2013 03:40 AM    ALT (SGPT) 25 02/04/2013 02:43 PM    ALT (SGPT) 31 03/30/2009 01:30 AM     No results found for: CPK, CPKX, CPX  Lab Results   Component Value Date/Time    Cholesterol, total 154 03/08/2017 09:56 AM    Cholesterol, total 216 02/10/2017 08:51 AM    HDL Cholesterol 44 03/08/2017 09:56 AM    HDL Cholesterol 40 02/10/2017 08:51 AM    LDL, calculated 84 03/08/2017 09:56 AM    LDL, calculated 149 02/10/2017 08:51 AM Triglyceride 128 03/08/2017 09:56 AM    Triglyceride 133 02/10/2017 08:51 AM     No results found for this or any previous visit. Assessment:         Patient Active Problem List    Diagnosis Date Noted    Incisional hernia, without obstruction or gangrene 03/08/2017    Essential hypertension 02/06/2017    Elevated serum glucose 02/06/2017    Hyperlipidemia 02/06/2017    Benign prostatic hyperplasia without lower urinary tract symptoms 02/06/2017    History of colon cancer 02/06/2017    BMI 32.0-32.9,adult 02/06/2017    Para-[facility]ostomy hernia ([facility] [address] 02/11/2013      Hx mild DM II (diet rx), hypertension, dyslipidemia, colon ca s/p resection (no chemo), without known prior cardiac hx. Lives on sailboat 8 mos/yr. Sx of mild BARBOSA. Lifelong non-smoker. No FH CAD. Physically active, retired . No prior cardiac w/u.      Plan:     Stress Treadmill EKG  Echo/doppler  Continue BP meds  Continue statin  Will call w/ results  Dietary rx  F/u with PCP as planned  F/u here one year, sooner dianna Grullon MD

## 2017-05-03 NOTE — PATIENT INSTRUCTIONS
CALL DR. HARMON' OFFICE TO SCHEDULE A FOLLOW UP APPOINTMENT ONCE YOUR CARDIAC TESTING HAS BEEN SCHEDULED AT [facility].   21

## 2017-05-03 NOTE — MR AVS SNAPSHOT
Visit Information Date & Time Provider Department Dept. Phone Encounter #  
 5/3/2017  1:20 PM Monie Mars, [address] [facility] [phone] Follow-up Instructions Return in about 1 year (around 5/3/2018). Follow-up and Disposition History Your Appointments 7/11/2017 10:30 AM  
Any with Jose Manuel Segal MD  
[address]) Appt Note: 3 mo f/u,CP$0,km/4. 4.2017  
 [address]  [address]. [phone]  
  
   
 [address] [facility] [facility] [address] Upcoming Health Maintenance Date Due INFLUENZA AGE 9 TO ADULT 8/1/2017 FOBT Q 1 YEAR AGE 50-75 2/10/2018 MEDICARE YEARLY EXAM 3/9/2018 GLAUCOMA SCREENING Q2Y 2/6/2019 DTaP/Tdap/Td series (2 - Td) 2/6/2027 Allergies as of 5/3/2017  Review Complete On: 5/3/2017 By: Monie Mars MD  
 No Known Allergies Current Immunizations  Reviewed on 2/11/2013 Name Date Influenza Vaccine 10/11/2012 Pneumococcal Polysaccharide (PPSV-23) 2/12/2013 12:05 PM  
  
 Not reviewed this visit You Were Diagnosed With   
  
 Codes Comments SOB (shortness of breath)    -  Primary ICD-10-CM: R06.02 
ICD-9-CM: 786.05 Essential hypertension     ICD-10-CM: I10 
ICD-9-CM: 401.9 Hyperlipidemia, unspecified hyperlipidemia type     ICD-10-CM: E78.5 ICD-9-CM: 272.4 History of colon cancer     ICD-10-CM: Z85.038 
ICD-9-CM: V10.05 Para-ileostomy hernia ([facility] [address]     ICD-10-CM: L86.91 ICD-9-CM: 569.69 Diabetes mellitus type 2, diet-controlled ([facility] [address]     ICD-10-CM: E11.9 ICD-9-CM: 250.00 Vitals BP Pulse Resp Height(growth percentile) Weight(growth percentile) SpO2  
 134/80 (BP 1 Location: Left arm, BP Patient Position: Sitting) 62 18 5' 10\" (1.778 m) 228 lb (103.4 kg) 98% BMI Smoking Status 32.71 kg/m2 Never Smoker Vitals History BMI and BSA Data Body Mass Index Body Surface Area 32.71 kg/m 2 2.26 m 2 Preferred Pharmacy Pharmacy Name Phone [address] [facility], [address] [facility] [address] Antonino Taylor Your Updated Medication List  
  
   
This list is accurate as of: 5/3/17  2:20 PM.  Always use your most recent med list. amLODIPine 5 mg tablet Commonly known as:  Magdalena Dominguezitt Take 1 Tab by mouth daily. aspirin 81 mg tablet Take 81 mg by mouth daily. atorvastatin 10 mg tablet Commonly known as:  LIPITOR Take 1 Tab by mouth daily. ketoconazole 2 % topical cream  
Commonly known as:  NIZORAL Apply  to affected area daily. lisinopril 40 mg tablet Commonly known as:  Kadeem Mash Take 1 Tab by mouth daily. Indications: hypertension  
  
 loperamide 2 mg tablet Commonly known as:  IMMODIUM Take 4 mg by mouth Before breakfast, lunch, and dinner. And with snacks  
  
 naproxen sodium 220 mg tablet Commonly known as:  NAPROSYN Take 220 mg by mouth daily as needed. tamsulosin 0.4 mg capsule Commonly known as:  FLOMAX Take 0.4 mg by mouth daily. We Performed the Following AMB POC EKG ROUTINE W/ 12 LEADS, INTER & REP [62125 CPT(R)] Follow-up Instructions Return in about 1 year (around 5/3/2018). To-Do List   
 Around 05/08/2017 ECHO:  2D ECHO COMPLETE ADULT (TTE) W OR WO CONTR Around 05/08/2017 ECG:  STRESS TEST CARDIAC Patient Instructions CALL DR. HARMON' OFFICE TO SCHEDULE A FOLLOW UP APPOINTMENT ONCE YOUR CARDIAC TESTING HAS BEEN SCHEDULED AT [facility]. 21  Please provide this summary of care documentation to your next provider. Your primary care clinician is listed as Suha Romano. If you have any questions after today's visit, please call [phone].

## 2017-05-24 DIAGNOSIS — I10 ESSENTIAL HYPERTENSION: ICD-10-CM

## 2017-05-24 RX ORDER — AMLODIPINE BESYLATE 5 MG/1
TABLET ORAL
Qty: 90 TAB | Refills: 1 | Status: SHIPPED | OUTPATIENT
Start: 2017-05-24 | End: 2017-10-03 | Stop reason: SDUPTHER

## 2017-05-24 RX ORDER — LISINOPRIL 40 MG/1
TABLET ORAL
Qty: 90 TAB | Refills: 1 | Status: SHIPPED | OUTPATIENT
Start: 2017-05-24 | End: 2017-10-03 | Stop reason: SDUPTHER

## 2017-06-02 ENCOUNTER — TELEPHONE (OUTPATIENT)
Dept: CARDIOLOGY CLINIC | Age: 72
End: 2017-06-02

## 2017-06-02 NOTE — TELEPHONE ENCOUNTER
Spoke with the pt. Verified patient with two patient identifiers. Results given. Pt might be leaving for [facility] but will let us know regarding follow up.

## 2017-06-02 NOTE — TELEPHONE ENCOUNTER
----- Message from Rand Landry MD sent at 5/17/2017 10:51 AM EDT -----  Regarding: echo  Advise echo shows normal pumping function, valves ok, no signficant problems.   Thanks [facility]    Call/advise stress test showed no definite evidence of ischemia/blockages. [facility]

## 2017-07-11 ENCOUNTER — OFFICE VISIT (OUTPATIENT)
Dept: FAMILY MEDICINE CLINIC | Age: 72
End: 2017-07-11

## 2017-07-11 VITALS
HEART RATE: 61 BPM | HEIGHT: 70 IN | WEIGHT: 230.8 LBS | RESPIRATION RATE: 16 BRPM | DIASTOLIC BLOOD PRESSURE: 80 MMHG | TEMPERATURE: 96.5 F | SYSTOLIC BLOOD PRESSURE: 140 MMHG | BODY MASS INDEX: 33.04 KG/M2 | OXYGEN SATURATION: 98 %

## 2017-07-11 DIAGNOSIS — I10 ESSENTIAL HYPERTENSION: Primary | ICD-10-CM

## 2017-07-11 DIAGNOSIS — H81.10 BPV (BENIGN POSITIONAL VERTIGO), UNSPECIFIED LATERALITY: ICD-10-CM

## 2017-07-11 DIAGNOSIS — B35.9 TINEA: ICD-10-CM

## 2017-07-11 DIAGNOSIS — R73.9 ELEVATED SERUM GLUCOSE: ICD-10-CM

## 2017-07-11 DIAGNOSIS — R06.02 SOB (SHORTNESS OF BREATH): ICD-10-CM

## 2017-07-11 RX ORDER — FLUCONAZOLE 150 MG/1
TABLET ORAL
Qty: 3 TAB | Refills: 0 | Status: SHIPPED | OUTPATIENT
Start: 2017-07-11 | End: 2017-10-03 | Stop reason: ALTCHOICE

## 2017-07-11 NOTE — MR AVS SNAPSHOT
Visit Information Date & Time Provider Department Dept. Phone Encounter #  
 7/11/2017 10:30 AM Alba Delarosa MD [address] [place] [address] [phone] [id number] Follow-up Instructions Return in about 3 months (around 10/11/2017). Your Appointments 3/12/2018  9:30 AM  
Medicare Physical with Alba Delarosa MD  
[address] [place] [address]) Appt Note:  $0 CP mbm  
 [address] [facility] [address]  [address] [phone]  
[phone]  
  
   
 [address] [address] Upcoming Health Maintenance Date Due INFLUENZA AGE 9 TO ADULT 8/1/2017 FOBT Q 1 YEAR AGE 50-75 2/10/2018 MEDICARE YEARLY EXAM 3/9/2018 GLAUCOMA SCREENING Q2Y 2/6/2019 DTaP/Tdap/Td series (2 - Td) 2/6/2027 Allergies as of 7/11/2017  Review Complete On: 7/11/2017 By: Alba Delarosa MD  
 No Known Allergies Current Immunizations  Reviewed on 2/11/2013 Name Date Influenza Vaccine 10/11/2012 Pneumococcal Polysaccharide (PPSV-23) 2/12/2013 12:05 PM  
  
 Not reviewed this visit You Were Diagnosed With   
  
 Codes Comments Essential hypertension    -  Primary ICD-10-CM: I10 
ICD-9-CM: 401.9 Elevated serum glucose     ICD-10-CM: R73.9 ICD-9-CM: 790.29   
 BPV (benign positional vertigo), unspecified laterality     ICD-10-CM: H81.10 ICD-9-CM: 386.11   
 SOB (shortness of breath)     ICD-10-CM: R06.02 
ICD-9-CM: 786.05 Tinea     ICD-10-CM: B35.9 ICD-9-CM: 110.9 BMI 32.0-32.9,adult     ICD-10-CM: N78.38 
ICD-9-CM: V85.32 Vitals BP Pulse Temp Resp Height(growth percentile) Weight(growth percentile) 140/80 61 96.5 °F (35.8 °C) (Oral) 16 5' 10\" (1.778 m) 230 lb 12.8 oz (104.7 kg) SpO2 BMI Smoking Status 98% 33.12 kg/m2 Never Smoker Vitals History BMI and BSA Data Body Mass Index Body Surface Area  
 33.12 kg/m 2 2.27 m 2 Preferred Pharmacy Pharmacy Name Phone [facility] [address], [address] [place] [address] Your Updated Medication List  
  
   
This list is accurate as of: 7/11/17 11:11 AM.  Always use your most recent med list. amLODIPine 5 mg tablet Commonly known as:  Orval Brigette Take 1 tablet by mouth  daily  
  
 aspirin 81 mg tablet Take 81 mg by mouth daily. atorvastatin 10 mg tablet Commonly known as:  LIPITOR Take 1 Tab by mouth daily. fluconazole 150 mg tablet Commonly known as:  DIFLUCAN One tab weekly for 3 weeks  
  
 ketoconazole 2 % topical cream  
Commonly known as:  NIZORAL Apply  to affected area daily. lisinopril 40 mg tablet Commonly known as:  Td Handing Take 1 tablet by mouth  daily for hypertension  
  
 loperamide 2 mg tablet Commonly known as:  IMMODIUM Take 4 mg by mouth Before breakfast, lunch, and dinner. And with snacks  
  
 naproxen sodium 220 mg tablet Commonly known as:  NAPROSYN Take 220 mg by mouth daily as needed. tamsulosin 0.4 mg capsule Commonly known as:  FLOMAX Take 0.4 mg by mouth daily. Prescriptions Sent to Pharmacy Refills  
 fluconazole (DIFLUCAN) 150 mg tablet 0 Sig: One tab weekly for 3 weeks Class: Normal  
 Pharmacy: [facility], [address] [place] [address] &  Ph #: [phone] We Performed the Following CBC WITH AUTOMATED DIFF [79400 CPT(R)] HEMOGLOBIN A1C WITH EAG [98159 CPT(R)] METABOLIC PANEL, BASIC [27822 CPT(R)] RI COLLECTION VENOUS BLOOD,VENIPUNCTURE [id number] CPT(R)] RI HANDLG&/OR CONVEY OF SPEC FOR TR OFFICE TO LAB [65038 CPT(R)] Follow-up Instructions Return in about 3 months (around 10/11/2017). To-Do List   
 07/18/2017 Imaging:  XR CHEST PA LAT Patient Instructions Continue current medications Diflucan once weekly x 3 weeks (follow up if rash persists) Work on diet and exercise Lab work today 
 
moniter BP and call if over 140/90 Xray Exercises for vertigo  follow up if worse Keep planned follow up visit Please provide this summary of care documentation to your next provider. Your primary care clinician is listed as Merry Briceño. If you have any questions after today's visit, please call [phone].

## 2017-07-11 NOTE — PATIENT INSTRUCTIONS
Continue current medications  Diflucan once weekly x 3 weeks (follow up if rash persists)    Work on diet and exercise    Lab work today    moniter BP and call if over 140/90    Xray     Exercises for vertigo  follow up if worse    Keep planned follow up visit

## 2017-07-11 NOTE — PROGRESS NOTES
Maria Del Carmen Mills is a 66 y.o. male who presents to the office today with the following:  Chief Complaint   Patient presents with    Follow-up     3 mo HTN / having some issues with vertigo       No Known Allergies    Current Outpatient Prescriptions   Medication Sig    fluconazole (DIFLUCAN) 150 mg tablet One tab weekly for 3 weeks    amLODIPine (NORVASC) 5 mg tablet Take 1 tablet by mouth  daily    lisinopril (PRINIVIL, ZESTRIL) 40 mg tablet Take 1 tablet by mouth  daily for hypertension    naproxen sodium (NAPROSYN) 220 mg tablet Take 220 mg by mouth daily as needed.  atorvastatin (LIPITOR) 10 mg tablet Take 1 Tab by mouth daily.  tamsulosin (FLOMAX) 0.4 mg capsule Take 0.4 mg by mouth daily.  ketoconazole (NIZORAL) 2 % topical cream Apply  to affected area daily.  aspirin 81 mg tablet Take 81 mg by mouth daily.  loperamide (IMMODIUM) 2 mg tablet Take 4 mg by mouth Before breakfast, lunch, and dinner. And with snacks      No current facility-administered medications for this visit. Past Medical History:   Diagnosis Date    Cancer [facility]) 2009    colon    Diabetes ([facility] [address]     Hypertension        Past Surgical History:   Procedure Laterality Date    ABDOMEN SURGERY PROC UNLISTED  2009    ileostomy    HX GI  2/11/13     REVISION / REPAIR ILEOSTOMY HERNIA    HX OTHER SURGICAL      vasectomy       History   Smoking Status    Never Smoker   Smokeless Tobacco    Never Used       Family History   Problem Relation Age of Onset    Alzheimer Mother    Andre Mano Cancer Brother      lung         History of Present Illness:  Patient here for  ongoing medical follow-up    Patient with a history of hypertension. Compliant with his Norvasc and lisinopril. Blood pressure upper range of normal today. Normal when he saw his cardiologist last month. He gets systolics in the [address] when he checks them at [facility] ([facility]). EKG 3/17. Negative chest x-ray within the last couple of years.   Recent basic m metabolic profile normal.  He has never smoked. On aspirin    His last visit he was complaining of some dyspnea on exertion. He noted this when he was trying to carry heavy loads up a hill from his boat. No chest pain or palpitations. EKG showed no acute changes. We did have him seen by cardiology. He has had an echocardiogram and a stress test.  Both were normal.  No change in his symptoms. No coughing or wheezing. As noted no history of smoking. I am going to check a chest x-ray today and a CBC. I suspect were dealing with some deconditioning. We did talk about a gradual increasing exercise program.  He will notify me if his symptoms worsen or if he develops any new or changing symptoms      He has a history of hyperlipidemia. He is now on Lipitor. Tolerating the medication. Follow-up lab work much improved. LDL at goal with normal LFTs February 2017. He has had a history of hyperglycemia/borderline diabetes. He was on Glucotrol. With the weight loss he began to experience hypoglycemia. He has been fine since stopping the medication. Repeat labs revealed glycohemoglobin borderline at 6.3 but not diabetic. Negative spot urine for microalbumin. We do not need to start medication at this point but he is aware of the importance of diet and exercise. He feels recently he has gained some weight and his sugars have been running a little higher. As high as 150 in the morning. We will recheck his glycohemoglobin to see if he needs to restart medication    Patient does have a history of vertigo. Is bothered him off and on over the last 5 years. He used to be on meclizine. He does get a bit dizzy if he moves his head quickly. He notices this on his boat when he has to look up at the mask. He also notices that if he sits up quickly in bed. He has no hearing issues no tinnitus. No other neurologic complaints. As noted over time his symptoms have improved. We discussed additional workup today.   He is not interested in that. He is going to go online and look for some vestibular exercises to do     patient does have a history of tinea cruris/corpus. Currently involving the axillary area as well as the inguinal area. He has been using kit is on-call cream on a daily basis. It keeps things under control with the rash persists. We will try weekly Diflucan          He has a history of colon cancer. He had this resected over 10 years ago. He was told the resection was complete and he did not need chemotherapy. He apparently followed up with his surgeon on one occasion to have a repeat colonoscopy but they were unable to perform this successfully through the ileostomy. He was told at that time he did not need follow-up scopes. Hemoccult fit test was negative 2017. He does have a hernia around the ostomy. Apparently this was repaired once and recurred. He is thinking about seeing another surgeon to discuss repairing this again but does not want to at this point. Patient does note some small varicose veins in his legs which are worse when he stands for prolonged period of time. Most of the time they do not bother him. We did discuss foot elevation and support hose. Those complaints have actually improved    Patient did have multiple seborrheic keratoses on his face and scalp today. He does have BPH. He has been on Flomax from his previous physician. This seems to work well for him. Because of his previous surgery we cannot do rectal exams he tells me. PSA 2/17 normal 1.1    Patient does have elevated BMI as noted weight up from last visit    His past medical history is otherwise negative. He has had one pneumonia shot.   He declines a second pneumonia shot, flu shot, shingles shot, tetanus booster      Review of Systems:      Review of systems negative except as noted above    Physical Exam:  Visit Vitals    /77 (BP 1 Location: Left arm, BP Patient Position: Sitting)    Pulse 61    Temp 96.5 °F (35.8 °C) (Oral)    Resp 16    Ht 5' 10\" (1.778 m)    Wt 230 lb 12.8 oz (104.7 kg)    SpO2 98%    BMI 33.12 kg/m2     Vitals:    07/11/17 1031   BP: 155/77   BP 1 Location: Left arm   BP Patient Position: Sitting   Pulse: 61   Resp: 16   Temp: 96.5 °F (35.8 °C)   TempSrc: Oral   SpO2: 98%   Weight: 230 lb 12.8 oz (104.7 kg)   Height: 5' 10\" (1.778 m)     Patient no acute distress vital signs repeated and was 140/80 on my check  Head is normocephalic. He had some seborrheic keratoses noted on the scalp  External ears normal.  Ear canals normal.  TMs were clear. Hearing was normal bilaterally with the finger rub   Eyes PERRLA. EOMs full. Sclera clear. He has seen his eye doctor recently. He has had cataract surgery left the people opening smaller than right. This is chronic. EOMs full. No nystagmus. Neck no nodes no masses no bruits  Chest was clear no wheezes rhonchi or rales. Good air exchange  Cor regular rate and rhythm no S3-S4 no murmurs  Abdomen mildly obese no HSM no masses soft and was nontender. Ileostomy in place. Extremities no edema. Nontender. Good range of motion. Small varicose veins noted  Gait and gross neurologic exam were normal   cranial nerves II through XII are intact  Romberg was normal   patient did have erythematous rash axillary area consistent with tinea        EKG 2/17 normal sinus rhythm with some minimal nonspecific T-wave flattening lateral leads. Unchanged from 2013    1. Essential hypertension  Controlled on current regiment. Negative cardiac workup. Patient will continue his current medication. Getting lab work today. Work on his diet and exercise program.  I plan to see him back in October before he leaves sooner if needed  - CBC WITH AUTOMATED DIFF  - METABOLIC PANEL, BASIC  - NV HANDLG&/OR CONVEY OF SPEC FOR TR OFFICE TO LAB  - NV COLLECTION VENOUS BLOOD,VENIPUNCTURE    2.  Elevated serum glucose    - HEMOGLOBIN A1C WITH EAG  - NV HANDLG&/OR CONVEY OF SPEC FOR TR OFFICE TO LAB  - CA COLLECTION VENOUS BLOOD,VENIPUNCTURE    3. BPV (benign positional vertigo), unspecified laterality  Exam benign today. Patient will work on his vestibular exercises and follow-up if his symptoms worsen    4. SOB (shortness of breath)   Suspect secondary to deconditioning. Checking x-ray and CBC. Patient will work on a gradual increasing exercise program  - XR CHEST PA LAT; Future    5. Tinea    - fluconazole (DIFLUCAN) 150 mg tablet; One tab weekly for 3 weeks  Dispense: 3 Tab; Refill: 0    6. BMI 32.0-32.9,adult    Controlled on current regimen. Negative cardiac workup. .. Patient Instructions   Continue current medications  Diflucan once weekly x 3 weeks (follow up if rash persists)    Work on diet and exercise    Lab work today    moniter BP and call if over 140/90    Xray     Exercises for vertigo  follow up if worse    Keep planned follow up visit         Continue current therapy plan except for indicated above. Verbal and written instructions (see AVS) provided.  Patient expresses understanding of diagnosis and treatment plan. Follow-up Disposition:  Return in about 3 months (around 10/11/2017). Soheila Murphy.  Patrice Manjarrez MD

## 2017-07-12 PROBLEM — E11.9 CONTROLLED TYPE 2 DIABETES MELLITUS WITHOUT COMPLICATION, WITHOUT LONG-TERM CURRENT USE OF INSULIN (HCC): Status: ACTIVE | Noted: 2017-07-12

## 2017-07-12 LAB
BASOPHILS # BLD AUTO: 0 X10E3/UL (ref 0–0.2)
BASOPHILS NFR BLD AUTO: 0 %
BUN SERPL-MCNC: 12 MG/DL (ref 8–27)
BUN/CREAT SERPL: 14 (ref 10–24)
CALCIUM SERPL-MCNC: 8.8 MG/DL (ref 8.6–10.2)
CHLORIDE SERPL-SCNC: 101 MMOL/L (ref 96–106)
CO2 SERPL-SCNC: 25 MMOL/L (ref 18–29)
CREAT SERPL-MCNC: 0.84 MG/DL (ref 0.76–1.27)
EOSINOPHIL # BLD AUTO: 0.7 X10E3/UL (ref 0–0.4)
EOSINOPHIL NFR BLD AUTO: 10 %
ERYTHROCYTE [DISTWIDTH] IN BLOOD BY AUTOMATED COUNT: 13.5 % (ref 12.3–15.4)
EST. AVERAGE GLUCOSE BLD GHB EST-MCNC: 146 MG/DL
GLUCOSE SERPL-MCNC: 120 MG/DL (ref 65–99)
HBA1C MFR BLD: 6.7 % (ref 4.8–5.6)
HCT VFR BLD AUTO: 45 % (ref 37.5–51)
HGB BLD-MCNC: 14.9 G/DL (ref 12.6–17.7)
IMM GRANULOCYTES # BLD: 0 X10E3/UL (ref 0–0.1)
IMM GRANULOCYTES NFR BLD: 0 %
LYMPHOCYTES # BLD AUTO: 1.7 X10E3/UL (ref 0.7–3.1)
LYMPHOCYTES NFR BLD AUTO: 24 %
MCH RBC QN AUTO: 32.2 PG (ref 26.6–33)
MCHC RBC AUTO-ENTMCNC: 33.1 G/DL (ref 31.5–35.7)
MCV RBC AUTO: 97 FL (ref 79–97)
MONOCYTES # BLD AUTO: 0.5 X10E3/UL (ref 0.1–0.9)
MONOCYTES NFR BLD AUTO: 7 %
NEUTROPHILS # BLD AUTO: 4.1 X10E3/UL (ref 1.4–7)
NEUTROPHILS NFR BLD AUTO: 59 %
PLATELET # BLD AUTO: 202 X10E3/UL (ref 150–379)
POTASSIUM SERPL-SCNC: 4.2 MMOL/L (ref 3.5–5.2)
RBC # BLD AUTO: 4.63 X10E6/UL (ref 4.14–5.8)
SODIUM SERPL-SCNC: 140 MMOL/L (ref 134–144)
WBC # BLD AUTO: 7.1 X10E3/UL (ref 3.4–10.8)

## 2017-07-12 NOTE — PROGRESS NOTES
CBC was normal  Chemistry panel normal except elevated sugars  A1c at 6.7 higher than previous  This is now in the diabetic range  Patient already on aspirin, lisinopril, atorvastatin  We do not need to add any medications at this point  He needs to follow a strict ADA/diabetic diet  Please get him a copy of 1 if he needs 1  He should keep his follow-up with me in October and I will be ordering follow-up lab work

## 2017-07-17 ENCOUNTER — TELEPHONE (OUTPATIENT)
Dept: FAMILY MEDICINE CLINIC | Age: 72
End: 2017-07-17

## 2017-09-05 RX ORDER — KETOCONAZOLE 20 MG/G
CREAM TOPICAL DAILY
Qty: 15 G | Refills: 5 | Status: SHIPPED | OUTPATIENT
Start: 2017-09-05 | End: 2017-10-03 | Stop reason: SDUPTHER

## 2017-10-03 ENCOUNTER — OFFICE VISIT (OUTPATIENT)
Dept: FAMILY MEDICINE CLINIC | Age: 72
End: 2017-10-03

## 2017-10-03 VITALS
HEART RATE: 56 BPM | BODY MASS INDEX: 33 KG/M2 | RESPIRATION RATE: 16 BRPM | SYSTOLIC BLOOD PRESSURE: 126 MMHG | OXYGEN SATURATION: 96 % | TEMPERATURE: 98.1 F | DIASTOLIC BLOOD PRESSURE: 64 MMHG | WEIGHT: 230 LBS

## 2017-10-03 DIAGNOSIS — I10 ESSENTIAL HYPERTENSION: Primary | ICD-10-CM

## 2017-10-03 DIAGNOSIS — E11.9 CONTROLLED TYPE 2 DIABETES MELLITUS WITHOUT COMPLICATION, WITH LONG-TERM CURRENT USE OF INSULIN (HCC): ICD-10-CM

## 2017-10-03 DIAGNOSIS — Z79.4 CONTROLLED TYPE 2 DIABETES MELLITUS WITHOUT COMPLICATION, WITH LONG-TERM CURRENT USE OF INSULIN (HCC): ICD-10-CM

## 2017-10-03 DIAGNOSIS — E78.5 HYPERLIPIDEMIA, UNSPECIFIED HYPERLIPIDEMIA TYPE: ICD-10-CM

## 2017-10-03 RX ORDER — KETOCONAZOLE 20 MG/G
CREAM TOPICAL
Qty: 60 G | Refills: 5 | Status: SHIPPED | OUTPATIENT
Start: 2017-10-03 | End: 2017-10-09 | Stop reason: SDUPTHER

## 2017-10-03 RX ORDER — AMLODIPINE BESYLATE 5 MG/1
TABLET ORAL
Qty: 90 TAB | Refills: 1 | Status: SHIPPED | OUTPATIENT
Start: 2017-10-03 | End: 2017-10-09 | Stop reason: SDUPTHER

## 2017-10-03 RX ORDER — TAMSULOSIN HYDROCHLORIDE 0.4 MG/1
0.4 CAPSULE ORAL DAILY
Qty: 90 CAP | Refills: 1 | Status: SHIPPED | OUTPATIENT
Start: 2017-10-03 | End: 2017-10-09 | Stop reason: SDUPTHER

## 2017-10-03 RX ORDER — LISINOPRIL 40 MG/1
TABLET ORAL
Qty: 90 TAB | Refills: 1 | Status: SHIPPED | OUTPATIENT
Start: 2017-10-03 | End: 2017-10-09 | Stop reason: SDUPTHER

## 2017-10-03 RX ORDER — ATORVASTATIN CALCIUM 10 MG/1
TABLET, FILM COATED ORAL
Qty: 90 TAB | Refills: 0 | Status: SHIPPED | OUTPATIENT
Start: 2017-10-03 | End: 2017-10-09 | Stop reason: SDUPTHER

## 2017-10-03 NOTE — PROGRESS NOTES
Chief Complaint   Patient presents with    Follow-up     3 mo f/u; needs refill for all meds for 7 mos     Visit Vitals    /64 (BP 1 Location: Left arm, BP Patient Position: Sitting)    Pulse (!) 56    Temp 98.1 °F (36.7 °C) (Oral)    Resp 16    Wt 230 lb (104.3 kg)    SpO2 96%    BMI 33 kg/m2     Sachin Morgan LPN

## 2017-10-03 NOTE — PATIENT INSTRUCTIONS
Continue current medications    Work on diet and exercise    Lab work    Keep planned follow up visit

## 2017-10-03 NOTE — MR AVS SNAPSHOT
Visit Information Date & Time Provider Department Dept. Phone Encounter #  
 10/3/2017 10:30 AM Dell Duane,  [facility] [phone] [id number] Follow-up Instructions Return in about 8 months (around 6/3/2018). Follow-up and Disposition History Your Appointments 3/12/2018  9:30 AM  
Medicare Physical with Dell Duane, MD  
[address]) Appt Note:  $0 CP mbm  
 [address] [facility] [phone]  
[phone]  
  
   
 [address] [address] Upcoming Health Maintenance Date Due  
 FOOT EXAM Q1 9/5/1955 EYE EXAM RETINAL OR DILATED Q1 9/5/1955 INFLUENZA AGE 9 TO ADULT 8/1/2017 HEMOGLOBIN A1C Q6M 1/11/2018 MICROALBUMIN Q1 2/10/2018 FOBT Q 1 YEAR AGE 50-75 2/10/2018 LIPID PANEL Q1 3/8/2018 MEDICARE YEARLY EXAM 3/9/2018 GLAUCOMA SCREENING Q2Y 2/6/2019 DTaP/Tdap/Td series (2 - Td) 2/6/2027 Allergies as of 10/3/2017  Review Complete On: 10/3/2017 By: Dell Duane, MD  
 No Known Allergies Current Immunizations  Reviewed on 2/11/2013 Name Date Influenza Vaccine 10/11/2012 Pneumococcal Polysaccharide (PPSV-23) 2/12/2013 12:05 PM  
  
 Not reviewed this visit You Were Diagnosed With   
  
 Codes Comments Essential hypertension    -  Primary ICD-10-CM: I10 
ICD-9-CM: 401.9 Hyperlipidemia, unspecified hyperlipidemia type     ICD-10-CM: E78.5 ICD-9-CM: 272.4 BMI 33.0-33.9,adult     ICD-10-CM: N94.43 
ICD-9-CM: V85.33 Controlled type 2 diabetes mellitus without complication, with long-term current use of insulin (HCC)     ICD-10-CM: E11.9, Z79.4 ICD-9-CM: 250.00, V58.67 Vitals BP Pulse Temp Resp Weight(growth percentile) SpO2  
 126/64 (BP 1 Location: Left arm, BP Patient Position: Sitting) (!) 56 98.1 °F (36.7 °C) (Oral) 16 230 lb (104.3 kg) 96% BMI Smoking Status 33 kg/m2 Never Smoker BMI and BSA Data Body Mass Index Body Surface Area  
 33 kg/m 2 2.27 m 2 Preferred Pharmacy Pharmacy Name Phone [facility] [id number] [facility], [address] [place] [address] Your Updated Medication List  
  
   
This list is accurate as of: 10/3/17 11:16 AM.  Always use your most recent med list. amLODIPine 5 mg tablet Commonly known as:  Brisa Baknelli Take 1 tablet by mouth  daily  
  
 aspirin 81 mg tablet Take 81 mg by mouth daily. atorvastatin 10 mg tablet Commonly known as:  LIPITOR Take 1 tablet by mouth  daily  
  
 glucose blood VI test strips strip Commonly known as:  FREESTYLE LITE STRIPS Test sugar daily DX E 11.9  
  
 ketoconazole 2 % topical cream  
Commonly known as:  NIZORAL Daily as needed  
  
 lisinopril 40 mg tablet Commonly known as:  Rebekah Primmer Take 1 tablet by mouth  daily for hypertension  
  
 loperamide 2 mg tablet Commonly known as:  IMMODIUM Take 4 mg by mouth Before breakfast, lunch, and dinner. And with snacks  
  
 naproxen sodium 220 mg tablet Commonly known as:  NAPROSYN Take 220 mg by mouth daily as needed. tamsulosin 0.4 mg capsule Commonly known as:  FLOMAX Take 1 Cap by mouth daily. Prescriptions Printed Refills  
 tamsulosin (FLOMAX) 0.4 mg capsule 1 Sig: Take 1 Cap by mouth daily. Class: Print Route: Oral  
 lisinopril (PRINIVIL, ZESTRIL) 40 mg tablet 1 Sig: Take 1 tablet by mouth  daily for hypertension Class: Print  
 ketoconazole (NIZORAL) 2 % topical cream 5 Sig: Daily as needed Class: Print  
 atorvastatin (LIPITOR) 10 mg tablet 0 Sig: Take 1 tablet by mouth  daily Class: Print  
 amLODIPine (NORVASC) 5 mg tablet 1 Sig: Take 1 tablet by mouth  daily Class: Print We Performed the Following ALT N5679406 CPT(R)] AST C8666079 CPT(R)] HEMOGLOBIN A1C WITH EAG [30773 CPT(R)]  DIABETES FOOT EXAM [HM7 Custom] LIPID PANEL [90349 CPT(R)] METABOLIC PANEL, BASIC [89517 CPT(R)] Follow-up Instructions Return in about 8 months (around 6/3/2018). Patient Instructions Continue current medications Work on diet and exercise Lab work Keep planned follow up visit Please provide this summary of care documentation to your next provider. Your primary care clinician is listed as Mayra Cook. If you have any questions after today's visit, please call [phone].

## 2017-10-03 NOTE — PROGRESS NOTES
Vannesa Christiansen is a 67 y.o. male who presents to the office today with the following:  Chief Complaint   Patient presents with    Follow-up     3 mo f/u; needs refill for all meds for 7 mos       No Known Allergies    Current Outpatient Prescriptions   Medication Sig    tamsulosin (FLOMAX) 0.4 mg capsule Take 1 Cap by mouth daily.  lisinopril (PRINIVIL, ZESTRIL) 40 mg tablet Take 1 tablet by mouth  daily for hypertension    ketoconazole (NIZORAL) 2 % topical cream Daily as needed    atorvastatin (LIPITOR) 10 mg tablet Take 1 tablet by mouth  daily    amLODIPine (NORVASC) 5 mg tablet Take 1 tablet by mouth  daily    glucose blood VI test strips (FREESTYLE LITE STRIPS) strip Test sugar daily DX E 11.9    naproxen sodium (NAPROSYN) 220 mg tablet Take 220 mg by mouth daily as needed.  aspirin 81 mg tablet Take 81 mg by mouth daily.  loperamide (IMMODIUM) 2 mg tablet Take 4 mg by mouth Before breakfast, lunch, and dinner. And with snacks      No current facility-administered medications for this visit. Past Medical History:   Diagnosis Date    Cancer [facility]) 2009    colon    Controlled type 2 diabetes mellitus without complication, without long-term current use of insulin ([facility] [address] 7/12/2017    Diabetes ([facility] [address]     Hypertension        Past Surgical History:   Procedure Laterality Date    ABDOMEN SURGERY PROC UNLISTED  2009    ileostomy    HX GI  2/11/13     REVISION / REPAIR ILEOSTOMY HERNIA    HX OTHER SURGICAL      vasectomy       History   Smoking Status    Never Smoker   Smokeless Tobacco    Never Used       Family History   Problem Relation Age of Onset    Alzheimer Mother     Cancer Brother      lung         History of Present Illness:  Patient here for  ongoing medical follow-up    Patient with a history of hypertension. Compliant with his Norvasc and lisinopril. Blood pressure in good control today. EKG 3/17. Negative chest x-ray 7/17. Casi Feng   Recent basicmetabolic profile normal.  He has never smoked. On aspirin no cardiac complaints. Previously he was complaining of some dyspnea on exertion. He noted this when he was trying to carry heavy loads up a hill from his boat. No chest pain or palpitations. EKG showed no acute changes. We did have him seen by cardiology. He has had an echocardiogram and a stress test.  Both were normal.  No change in his symptoms. No coughing or wheezing. As noted no history of smoking. Chest x-ray was negative. He has been working on an exercise program.  He states his symptoms have improved    He has a history of hyperlipidemia. He is now on Lipitor. Tolerating the medication. Follow-up lab work much improved LDL at goal with normal LFTs February 2017. Repeat labs ordered    He has had a history of hyperglycemia/borderline diabetes. He was on Glucotrol. With the weight loss he began to experience hypoglycemia. He has been fine since stopping the medication. At that time repeat labs revealed glycohemoglobin borderline at 6.3 but not diabetic. Negative spot urine for microalbumin. Lab work over the summer showed his glycohemoglobin agrees to 6.7. He has been working on his diet since that time. He has not lost weight but his fasting sugars have improved to the 115 range. Repeat lab work ordered this morning. Diabetic foot exam 10/17. Patient saw his eye doctor over the summer    Patient does have a history of vertigo. Is bothered him off and on over the last 5 years. He used to be on meclizine. He does get a bit dizzy if he moves his head quickly. He notices this on his boat when he has to look up at the mask. He also notices that if he sits up quickly in bed. He has no hearing issues no tinnitus. No other neurologic complaints. As noted over time his symptoms have improved. We discussed additional workup previously. He is not interested in that. No complaints today    patient does have a history of tinea cruris/corpus.   I did treat him with Diflucan for several weeks. Symptoms improved but now returned. He does use Nizoral on a regular basis and states this controls his symptoms and did ask for refill of that. He was not interested in additional treatment at this point        He has a history of colon cancer. He had this resected over 10 years ago. He was told the resection was complete and he did not need chemotherapy. He apparently followed up with his surgeon on one occasion to have a repeat colonoscopy but they were unable to perform this successfully through the ileostomy. He was told at that time he did not need follow-up scopes. Hemoccult fit test was negative 2017. He does have a hernia around the ostomy. Apparently this was repaired once and recurred. He is thinking about seeing another surgeon to discuss repairing this again but does not want to at this point. Patient does note some small varicose veins in his legs which are worse when he stands for prolonged period of time. Most of the time they do not bother him. We did discuss foot elevation and support hose. Those complaints have actually improved    Patient did have multiple seborrheic keratoses on his face and scalp today. He does have BPH. He has been on Flomax from his previous physician. This seems to work well for him. Because of his previous surgery we cannot do rectal exams he tells me. PSA 2/17 normal 1.1    Patient does have elevated BMI as noted. Importance of diet and exercise stressed    His past medical history is otherwise negative. He has had one pneumonia shot.   He declines a second pneumonia shot, flu shot, shingles shot, tetanus booster      Review of Systems:      Review of systems negative except as noted above    Physical Exam:  Visit Vitals    /64 (BP 1 Location: Left arm, BP Patient Position: Sitting)    Pulse (!) 56    Temp 98.1 °F (36.7 °C) (Oral)    Resp 16    Wt 230 lb (104.3 kg)    SpO2 96%    BMI 33 kg/m2     Vitals:    10/03/17 1029   BP: 126/64   BP 1 Location: Left arm   BP Patient Position: Sitting   Pulse: (!) 56   Resp: 16   Temp: 98.1 °F (36.7 °C)   TempSrc: Oral   SpO2: 96%   Weight: 230 lb (104.3 kg)     Patient no acute distress vital signs as above   head is normocephalic. He had some seborrheic keratoses noted on the scalp  External ears normal  Eyes PERRLA. EOMs full. Sclera clear. He has had cataract surgery in the past.  The left palpebral opening is smaller than the right which is chronic     Neck no nodes no masses no bruits  Chest was clear no wheezes rhonchi or rales. Good air exchange  Cor regular rate and rhythm no S3-S4 no murmurs  Abdomen mildly obese no HSM no masses soft and was nontender. Ileostomy in place. Extremities no edema. Nontender. Good range of motion. Small varicose veins noted  Gait and gross neurologic exam were normal   Diabetic foot exam: Good diabetic foot care. No open sores or ulcerations. Vascular exam normal with capillary refill and/or good pedal pulses. Good light touch sensation      1. Essential hypertension  Controlled today. Overall patient medically stable. He is going to continue with his current medication. I am checking follow-up lab work. He is going to try to work harder on his diet and exercise program.  He is leaving the area for the winter. He plans to back in June. He will follow-up at that time. I have refilled his medications  - lisinopril (PRINIVIL, ZESTRIL) 40 mg tablet; Take 1 tablet by mouth  daily for hypertension  Dispense: 90 Tab; Refill: 1  - amLODIPine (NORVASC) 5 mg tablet; Take 1 tablet by mouth  daily  Dispense: 90 Tab; Refill: 1    2. Hyperlipidemia, unspecified hyperlipidemia type    - atorvastatin (LIPITOR) 10 mg tablet; Take 1 tablet by mouth  daily  Dispense: 90 Tab; Refill: 0  - LIPID PANEL  - AST  - ALT    3. BMI 33.0-33.9,adult      4.  Controlled type 2 diabetes mellitus without complication, with long-term current use of insulin ([facility] [address]    - METABOLIC PANEL, BASIC  - HEMOGLOBIN A1C WITH EAG  - HM DIABETES FOOT EXAM    Patient Instructions   Continue current medications    Work on diet and exercise    Lab work    Keep planned follow up visit         Continue current therapy plan except for indicated above. Verbal and written instructions (see AVS) provided.  Patient expresses understanding of diagnosis and treatment plan. Follow-up Disposition:  Return in about 8 months (around 6/3/2018). Juan Diego Bryan.  Daniel Jain MD        And aspirin

## 2017-10-04 LAB
ALT SERPL-CCNC: 16 IU/L (ref 0–44)
AST SERPL-CCNC: 18 IU/L (ref 0–40)
BUN SERPL-MCNC: 12 MG/DL (ref 8–27)
BUN/CREAT SERPL: 14 (ref 10–24)
CALCIUM SERPL-MCNC: 9.2 MG/DL (ref 8.6–10.2)
CHLORIDE SERPL-SCNC: 106 MMOL/L (ref 96–106)
CHOLEST SERPL-MCNC: 150 MG/DL (ref 100–199)
CO2 SERPL-SCNC: 24 MMOL/L (ref 18–29)
CREAT SERPL-MCNC: 0.85 MG/DL (ref 0.76–1.27)
EST. AVERAGE GLUCOSE BLD GHB EST-MCNC: 148 MG/DL
GLUCOSE SERPL-MCNC: 119 MG/DL (ref 65–99)
HBA1C MFR BLD: 6.8 % (ref 4.8–5.6)
HDLC SERPL-MCNC: 39 MG/DL
INTERPRETATION, 910389: NORMAL
LDLC SERPL CALC-MCNC: 77 MG/DL (ref 0–99)
POTASSIUM SERPL-SCNC: 4.3 MMOL/L (ref 3.5–5.2)
SODIUM SERPL-SCNC: 145 MMOL/L (ref 134–144)
TRIGL SERPL-MCNC: 170 MG/DL (ref 0–149)
VLDLC SERPL CALC-MCNC: 34 MG/DL (ref 5–40)

## 2017-10-04 NOTE — PROGRESS NOTES
Left a message for a call back from patient - Dr. Kenny Flores had given patient paper scripts, as patient was about to leave the country (not at [place]). Asked that patient please call me so he can decide if he needs paper scripts or mail order.

## 2017-10-04 NOTE — PROGRESS NOTES
Labs reviewed please notify patient  Chemistry panel stable  Sugars remain stable from last time. Glycohemoglobin 6.8.   In the diabetic range but considered in acceptable control  Lipids also in acceptable range but a bit higher than previous    Patient should continue his current medications  Try and work harder on his diet and exercise program  Keep planned follow-up in the spring and we will recheck lab work at that time

## 2017-10-04 NOTE — PROGRESS NOTES
Notified patient of recent labs; refills look like they were sent to [facility] - should have gone to mail order.

## 2017-10-05 DIAGNOSIS — E78.5 HYPERLIPIDEMIA, UNSPECIFIED HYPERLIPIDEMIA TYPE: ICD-10-CM

## 2017-10-05 RX ORDER — ATORVASTATIN CALCIUM 10 MG/1
TABLET, FILM COATED ORAL
Qty: 90 TAB | OUTPATIENT
Start: 2017-10-05

## 2017-10-05 NOTE — TELEPHONE ENCOUNTER
See by Obi Martin    Request for atorvastatin sent to his mail order pharmacy  Patient was already given printed prescription to take with him on his trip

## 2017-10-09 DIAGNOSIS — I10 ESSENTIAL HYPERTENSION: ICD-10-CM

## 2017-10-09 DIAGNOSIS — E78.5 HYPERLIPIDEMIA, UNSPECIFIED HYPERLIPIDEMIA TYPE: ICD-10-CM

## 2017-10-09 RX ORDER — KETOCONAZOLE 20 MG/G
CREAM TOPICAL
Qty: 60 G | Refills: 5 | Status: SHIPPED | OUTPATIENT
Start: 2017-10-09 | End: 2018-06-20 | Stop reason: SDUPTHER

## 2017-10-09 RX ORDER — AMLODIPINE BESYLATE 5 MG/1
TABLET ORAL
Qty: 90 TAB | Refills: 1 | Status: SHIPPED | OUTPATIENT
Start: 2017-10-09 | End: 2017-12-30 | Stop reason: SDUPTHER

## 2017-10-09 RX ORDER — LISINOPRIL 40 MG/1
TABLET ORAL
Qty: 90 TAB | Refills: 1 | Status: SHIPPED | OUTPATIENT
Start: 2017-10-09 | End: 2018-05-29

## 2017-10-09 RX ORDER — ATORVASTATIN CALCIUM 10 MG/1
TABLET, FILM COATED ORAL
Qty: 90 TAB | Refills: 2 | Status: SHIPPED | OUTPATIENT
Start: 2017-10-09 | End: 2017-10-26 | Stop reason: SDUPTHER

## 2017-10-09 RX ORDER — ATORVASTATIN CALCIUM 10 MG/1
TABLET, FILM COATED ORAL
Qty: 90 TAB | Refills: 2 | OUTPATIENT
Start: 2017-10-09

## 2017-10-09 RX ORDER — TAMSULOSIN HYDROCHLORIDE 0.4 MG/1
0.4 CAPSULE ORAL DAILY
Qty: 90 CAP | Refills: 1 | Status: SHIPPED | OUTPATIENT
Start: 2017-10-09 | End: 2017-12-02 | Stop reason: SDUPTHER

## 2017-10-09 NOTE — TELEPHONE ENCOUNTER
Patient has requested refill of his medications called to his mail order pharmacy in addition to the hand written prescriptions I gave him at the last visit to take with him on his boat this winter.   Recent lab work acceptable  Prescriptions called

## 2017-10-26 DIAGNOSIS — E78.5 HYPERLIPIDEMIA, UNSPECIFIED HYPERLIPIDEMIA TYPE: ICD-10-CM

## 2017-10-26 RX ORDER — ATORVASTATIN CALCIUM 10 MG/1
TABLET, FILM COATED ORAL
Qty: 90 TAB | Refills: 2 | Status: SHIPPED | OUTPATIENT
Start: 2017-10-26 | End: 2018-05-29 | Stop reason: SDUPTHER

## 2017-10-26 NOTE — TELEPHONE ENCOUNTER
Patient leaving for the [facility].   He is checked with local pharmacy and as long as I give him a prescription for 3 months with refills they will make sure this gets filled and he can take it with him for the winter months  He plans to follow-up with me in the spring  Refill okayed 90 days with 2 refills

## 2017-12-29 ENCOUNTER — TELEPHONE (OUTPATIENT)
Dept: FAMILY MEDICINE CLINIC | Age: 72
End: 2017-12-29

## 2017-12-29 DIAGNOSIS — I10 ESSENTIAL HYPERTENSION: ICD-10-CM

## 2017-12-29 NOTE — TELEPHONE ENCOUNTER
I attempted to return call again but I can call would not go through. Patient did tell my nurse that he is on a sailboat and the phones may or may not work    I was able to get through to the patient Saturday 12/30. He states he has been a lot more physically active in the [facility] where he is right now. His blood pressures have been coming down a bit. Yesterday his systolics were below 555 and he was feeling a little lightheaded. He did not take the lisinopril or the amlodipine. He had no other symptoms URI complaints vomiting diarrhea fever etc.  No chest pain. Today his blood pressures are running in the 125 through 823 range systolic. Again he is feeling well    At this point I told him to continue with the lisinopril 40 mg daily. He will take this in the morning. He is going to check his blood pressure in the evening. If his systolic is greater than 092 he will take half and amlodipine, 2.5 mg. If his systolics are less than 024 he will hold the amlodipine.   He is going to call me on Thursday with his blood pressure readings

## 2017-12-30 RX ORDER — AMLODIPINE BESYLATE 5 MG/1
TABLET ORAL
Qty: 90 TAB | Refills: 1
Start: 2017-12-30 | End: 2018-05-29

## 2018-05-29 ENCOUNTER — OFFICE VISIT (OUTPATIENT)
Dept: FAMILY MEDICINE CLINIC | Age: 73
End: 2018-05-29

## 2018-05-29 VITALS
WEIGHT: 207.6 LBS | DIASTOLIC BLOOD PRESSURE: 80 MMHG | TEMPERATURE: 97.8 F | HEIGHT: 70 IN | RESPIRATION RATE: 16 BRPM | SYSTOLIC BLOOD PRESSURE: 140 MMHG | BODY MASS INDEX: 29.72 KG/M2 | HEART RATE: 60 BPM

## 2018-05-29 DIAGNOSIS — Z85.038 HISTORY OF COLON CANCER: ICD-10-CM

## 2018-05-29 DIAGNOSIS — Z79.4 CONTROLLED TYPE 2 DIABETES MELLITUS WITHOUT COMPLICATION, WITH LONG-TERM CURRENT USE OF INSULIN (HCC): ICD-10-CM

## 2018-05-29 DIAGNOSIS — E78.5 HYPERLIPIDEMIA, UNSPECIFIED HYPERLIPIDEMIA TYPE: ICD-10-CM

## 2018-05-29 DIAGNOSIS — I10 ESSENTIAL HYPERTENSION: ICD-10-CM

## 2018-05-29 DIAGNOSIS — E11.9 CONTROLLED TYPE 2 DIABETES MELLITUS WITHOUT COMPLICATION, WITH LONG-TERM CURRENT USE OF INSULIN (HCC): ICD-10-CM

## 2018-05-29 DIAGNOSIS — Z00.00 MEDICARE ANNUAL WELLNESS VISIT, SUBSEQUENT: Primary | ICD-10-CM

## 2018-05-29 DIAGNOSIS — N40.0 BENIGN PROSTATIC HYPERPLASIA, UNSPECIFIED WHETHER LOWER URINARY TRACT SYMPTOMS PRESENT: ICD-10-CM

## 2018-05-29 RX ORDER — TAMSULOSIN HYDROCHLORIDE 0.4 MG/1
CAPSULE ORAL
Qty: 90 CAP | Refills: 1 | Status: SHIPPED | OUTPATIENT
Start: 2018-05-29 | End: 2018-09-10 | Stop reason: SDUPTHER

## 2018-05-29 RX ORDER — FLUTICASONE FUROATE 27.5 UG/1
SPRAY, METERED NASAL
Refills: 0 | COMMUNITY
Start: 2018-04-28

## 2018-05-29 RX ORDER — ATORVASTATIN CALCIUM 10 MG/1
TABLET, FILM COATED ORAL
Qty: 90 TAB | Refills: 1 | Status: SHIPPED | OUTPATIENT
Start: 2018-05-29 | End: 2018-09-25 | Stop reason: SDUPTHER

## 2018-05-29 RX ORDER — BENZONATATE 100 MG/1
CAPSULE ORAL
Refills: 0 | COMMUNITY
Start: 2018-04-28 | End: 2018-05-29

## 2018-05-29 NOTE — PATIENT INSTRUCTIONS
Schedule of Personalized Health Plan  (Provide Copy to Patient)  The best way to stay healthy is to live a healthy lifestyle. A healthy lifestyle includes regular exercise, eating a well-balanced diet, keeping a healthy weight and not smoking. Regular physical exams and screening tests are another important way to take care of yourself. Preventive exams provided by health care providers can find health problems early when treatment works best and can keep you from getting certain diseases or illnesses. Preventive services include exams, lab tests, screenings, shots, monitoring and information to help you take care of your own health. All people over 65 should have a pneumonia shot. Pneumonia shots are usually only needed once in a lifetime unless your doctor decides differently. All people over 65 should have a yearly flu shot. People over 65 are at medium to high risk for Hepatitis B. Three shots are needed for complete protection. In addition to your physical exam, some screening tests are recommended:    Bone mass measurement (dexa scan) is recommended every two years if you have certain risk factors, such as personal history of vertebral fracture or chronic steroid medication use    Diabetes Mellitus screening is recommended every year. Glaucoma is an eye disease caused by high pressure in the eye. An eye exam is recommended every year. Cardiovascular screening tests that check your cholesterol and other blood fat (lipid) levels are recommended every five years. Colorectal Cancer screening tests help to find pre-cancerous polyps (growths in the colon) so they can be removed before they turn into cancer. Tests ordered for screening depend on your personal and family history risk factors.     Screening for Prostate Cancer is recommended yearly with a digital rectal exam and/or a PSA test    Here is a list of your current Health Maintenance items with a due date:  Health Maintenance Topic Date Due    EYE EXAM RETINAL OR DILATED Q1  09/05/1955    MICROALBUMIN Q1  02/10/2018    FOBT Q 1 YEAR AGE 50-75  02/10/2018    MEDICARE YEARLY EXAM  03/14/2018    HEMOGLOBIN A1C Q6M  04/03/2018    Influenza Age 5 to Adult  08/01/2018    FOOT EXAM Q1  10/03/2018    LIPID PANEL Q1  10/03/2018    GLAUCOMA SCREENING Q2Y  02/06/2019    DTaP/Tdap/Td series (2 - Td) 02/06/2027    ZOSTER VACCINE AGE 60>  Addressed    Pneumococcal 65+ Low/Medium Risk  Addressed

## 2018-05-29 NOTE — PROGRESS NOTES
Clovis Austin is a 67 y.o. male who presents to the office today with the following:  Chief Complaint   Patient presents with    Diabetes     f/u visit    Annual Wellness Visit     ,CEH well       No Known Allergies    Current Outpatient Prescriptions   Medication Sig    FLONASE SENSIMIST 27.5 mcg/actuation nasal spray U 2 SPRAYS IEN ONCE D    tamsulosin (FLOMAX) 0.4 mg capsule TAKE 1 CAPSULE BY MOUTH  DAILY    atorvastatin (LIPITOR) 10 mg tablet Take 1 tablet by mouth  daily    ketoconazole (NIZORAL) 2 % topical cream Daily as needed    glucose blood VI test strips (FREESTYLE LITE STRIPS) strip Test sugar daily DX E 11.9    naproxen sodium (NAPROSYN) 220 mg tablet Take 220 mg by mouth daily as needed.  aspirin 81 mg tablet Take 81 mg by mouth daily.  loperamide (IMMODIUM) 2 mg tablet Take 4 mg by mouth Before breakfast, lunch, and dinner. And with snacks      No current facility-administered medications for this visit. Past Medical History:   Diagnosis Date    Cancer [facility]) 2009    colon    Controlled type 2 diabetes mellitus without complication, without long-term current use of insulin ([address] 7/12/2017    Diabetes ([facility] [address]     Hypertension        Past Surgical History:   Procedure Laterality Date    ABDOMEN SURGERY PROC UNLISTED  2009    ileostomy    HX GI  2/11/13     REVISION / REPAIR ILEOSTOMY HERNIA    HX OTHER SURGICAL      vasectomy       History   Smoking Status    Never Smoker   Smokeless Tobacco    Never Used       Family History   Problem Relation Age of Onset    Alzheimer Mother     Cancer Brother      lung         History of Present Illness:  Patient here for annual Medicare wellness visit and ongoing medical follow-up  Please refer to separate Medicare wellness note    Patient with a history of hypertension. Patient was on lisinopril and amlodipine. He spent the winter on a boat in the [facility]. He states he was not eating as much.   He did lose 20 pounds during this time. He began to have some episodes of dizziness with systolic blood pressures in the  range. He initially stopped the amlodipine and then the lisinopril as well. Patient states when he checks his blood pressure he gets systolics in the 451-406 range. He is feeling much better off the medications. .  EKG 5/18. Negative chest x-ray 7/17. Brandee Gould Recent basicmetabolic profile normal.  He has never smoked. On aspirin no cardiac complaints. Previously he was complaining of some dyspnea on exertion. He noted this when he was trying to carry heavy loads up a hill from his boat. No chest pain or palpitations. EKG showed no acute changes. We did have him seen by cardiology. He has had an echocardiogram and a stress test.  Both were normal.  .  No coughing or wheezing. As noted no history of smoking. Chest x-ray was negative. He has been working on an exercise program.  He states his symptoms have improved    He has a history of hyperlipidemia. He is now on Lipitor. Tolerating the medication. Follow-up lab work much improved LDL at goal with normal LFTs 10/17. Repeat labs ordered    He has had a history of borderline diabetes. He was on Glucotrol. With the weight loss he began to experience hypoglycemia. He has been fine since stopping the medication. At that time repeat labs revealed glycohemoglobin borderline at 6.3 but not diabetic. Follow-up lab work done in October reveals A1c 6.8.   spot urine for microalbumin 5/18. Repeat lab work ordered this morning. Diabetic foot exam 10/17, 5/18. Patient saw his eye doctor over the October 2017    Patient does have a history of vertigo. Is bothered him off and on over the last 5 years. He used to be on meclizine. He does get a bit dizzy if he moves his head quickly. He notices this on his boat when he has to look up at the mask. He also notices that if he sits up quickly in bed. He has no hearing issues no tinnitus.   No other neurologic complaints. As noted over time his symptoms have improved. We discussed additional workup previously. He is not interested in that. No complaints today    patient does have a history of tinea cruris/corpus. I did treat him with ketoconazole. He does continue to use this periodically which controls his symptoms. Does not need a refill today    He has a history of colon cancer. He had this resected over 10 years ago. He was told the resection was complete and he did not need chemotherapy. He apparently followed up with his surgeon on one occasion to have a repeat colonoscopy but they were unable to perform this successfully through the ileostomy. He was told at that time he did not need follow-up scopes. Hemoccult fit test was negative 2017. Diego Jessica Repeat test ordered 5/18    He does have a hernia around the ostomy. Apparently this was repaired once and recurred. He is thinking about seeing another surgeon to discuss repairing this again but does not want to at this point. No complaints today    Patient does note some small varicose veins in his legs which are worse when he stands for prolonged period of time. Most of the time they do not bother him. We did discuss foot elevation and support hose. Those complaints have actually improved    Patient did have multiple seborrheic keratoses on his face and scalp today. He does have BPH. He has been on Flomax from his previous physician. This seems to work well for him. Because of his previous surgery we cannot do rectal exams he tells me. PSA 2/17 normal 1.1. Repeat ordered 5/18    Patient does have elevated BMI as noted. Weight is down from last visit. I commended his efforts importance of diet and exercise stressed    His past medical history is otherwise negative. He has had one pneumonia shot.   He declines a second pneumonia shot, flu shot, shingles shot, tetanus booster      Review of Systems:      Review of systems negative except as noted above    Physical Exam:  Visit Vitals    /80    Pulse 60    Temp 97.8 °F (36.6 °C) (Oral)    Resp 16    Ht 5' 10\" (1.778 m)    Wt 207 lb 9.6 oz (94.2 kg)    BMI 29.79 kg/m2     Vitals:    05/29/18 1021 05/29/18 1115   BP: 152/88 140/80   BP 1 Location: Left arm    BP Patient Position: Sitting    Pulse: 60    Resp: 16    Temp: 97.8 °F (36.6 °C)    TempSrc: Oral    Weight: 207 lb 9.6 oz (94.2 kg)    Height: 5' 10\" (1.778 m)      Patient no acute distress vital signs as above on recheck  head is normocephalic. He had some seborrheic keratoses noted on the scalp  External ears normal.  Ear canals are normal.  TMs appeared clear. Hearing was grossly normal  Eyes PERRLA. EOMs full. Sclera clear. He has had cataract surgery in the past.  The left palpebral opening is smaller than the right which is chronic   Nose within normal limits  OP good dental repair. Pharynx appeared normal.  No obvious lesions. Structures midline  Neck no nodes no masses no bruits  Chest was clear no wheezes rhonchi or rales. Good air exchange  Cor regular rate and rhythm with some ectopic beats. No S3-S4 no murmurs  Abdomen mildly obese no HSM no masses soft and was nontender. Ileostomy in place. Extremities no edema. Nontender. Good range of motion. Small varicose veins noted  Gait and gross neurologic exam were normal   Diabetic foot exam: Good diabetic foot care. No open sores or ulcerations. Vascular exam normal with capillary refill and/or good pedal pulses. Good light touch sensation. Patient did have some onychomycosis  EKG 5/18. Sinus bradycardia with occasional PVC no acute changes. 1. Medicare annual wellness visit, subsequent  See separate Medicare wellness note    2. Essential hypertension  Blood pressure is borderline today. This was discussed with patient. I recommended restarting lisinopril at a lower dose for blood pressure and  renal protection.   Patient aware but declines  - AMB POC EKG ROUTINE W/ 12 LEADS, INTER & REP  - CBC WITH AUTOMATED DIFF  - URINALYSIS W/ RFLX MICROSCOPIC  - TSH 3RD GENERATION  - METABOLIC PANEL, BASIC    3. Hyperlipidemia, unspecified hyperlipidemia type  Checking lab work. Medications refilled  - LIPID PANEL  - ALT  - AST    4. Controlled type 2 diabetes mellitus without complication, with long-term current use of insulin ([facility] [address]  Patient will continue working on his diet. Lab work today  -  DIABETES FOOT EXAM  - HEMOGLOBIN A1C WITH EAG    5. BMI 29.0-29.9,adult  Weight improving with diet    6. History of colon cancer    - OCCULT BLOOD, IMMUNOASSAY (FIT); Future    7. Benign prostatic hyperplasia, unspecified whether lower urinary tract symptoms present    - PROSTATE SPECIFIC AG        Patient Instructions     Schedule of Personalized Health Plan  (Provide Copy to Patient)  The best way to stay healthy is to live a healthy lifestyle. A healthy lifestyle includes regular exercise, eating a well-balanced diet, keeping a healthy weight and not smoking. Regular physical exams and screening tests are another important way to take care of yourself. Preventive exams provided by health care providers can find health problems early when treatment works best and can keep you from getting certain diseases or illnesses. Preventive services include exams, lab tests, screenings, shots, monitoring and information to help you take care of your own health. All people over 65 should have a pneumonia shot. Pneumonia shots are usually only needed once in a lifetime unless your doctor decides differently. All people over 65 should have a yearly flu shot. People over 65 are at medium to high risk for Hepatitis B. Three shots are needed for complete protection.   In addition to your physical exam, some screening tests are recommended:    Bone mass measurement (dexa scan) is recommended every two years if you have certain risk factors, such as personal history of vertebral fracture or chronic steroid medication use    Diabetes Mellitus screening is recommended every year. Glaucoma is an eye disease caused by high pressure in the eye. An eye exam is recommended every year. Cardiovascular screening tests that check your cholesterol and other blood fat (lipid) levels are recommended every five years. Colorectal Cancer screening tests help to find pre-cancerous polyps (growths in the colon) so they can be removed before they turn into cancer. Tests ordered for screening depend on your personal and family history risk factors. Screening for Prostate Cancer is recommended yearly with a digital rectal exam and/or a PSA test    Here is a list of your current Health Maintenance items with a due date:  Health Maintenance   Topic Date Due    EYE EXAM RETINAL OR DILATED Q1  09/05/1955    MICROALBUMIN Q1  02/10/2018    FOBT Q 1 YEAR AGE 50-75  02/10/2018    MEDICARE YEARLY EXAM  03/14/2018    HEMOGLOBIN A1C Q6M  04/03/2018    Influenza Age 9 to Adult  08/01/2018    FOOT EXAM Q1  10/03/2018    LIPID PANEL Q1  10/03/2018    GLAUCOMA SCREENING Q2Y  02/06/2019    DTaP/Tdap/Td series (2 - Td) 02/06/2027    ZOSTER VACCINE AGE 60>  Addressed    Pneumococcal 65+ Low/Medium Risk  Addressed             Continue current therapy plan except for indicated above. Verbal and written instructions (see AVS) provided.  Patient expresses understanding of diagnosis and treatment plan. Follow-up Disposition:  Return in about 5 months (around 10/29/2018). Isabel Sanchez.  Gregg Carter MD        And aspirin

## 2018-05-29 NOTE — PROGRESS NOTES
Jose Garcia is a 67 y.o. male and presents for annual Medicare Wellness Visit. Problem List: Reviewed with patient and discussed risk factors.     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.19    Essential hypertension I10    Elevated serum glucose R73.9    Hyperlipidemia E78.5    Benign prostatic hyperplasia without lower urinary tract symptoms N40.0    History of colon cancer Z85.038    BMI 32.0-32.9,adult Z68.32    Incisional hernia, without obstruction or gangrene K43.2    BPV (benign positional vertigo) H81.10    Controlled type 2 diabetes mellitus without complication, without long-term current use of insulin (HCC) E11.9    Controlled type 2 diabetes mellitus without complication, with long-term current use of insulin (HCC) E11.9, Z79.4       Current medical providers:  Patient Care Team:  Abby Watt MD as PCP - General (Family Practice)  Mario More MD (Cardiology)    PSH: Reviewed with patient  Past Surgical History:   Procedure Laterality Date    ABDOMEN SURGERY PROC UNLISTED  2009    ileostomy    HX GI  2/11/13     REVISION / REPAIR ILEOSTOMY HERNIA    HX OTHER SURGICAL      vasectomy        SH: Reviewed with patient  Social History   Substance Use Topics    Smoking status: Never Smoker    Smokeless tobacco: Never Used    Alcohol use Yes      Comment: very rare       FH: Reviewed with patient  Family History   Problem Relation Age of Onset    Alzheimer Mother     Cancer Brother      lung       Medications/Allergies: Reviewed with patient  Current Outpatient Prescriptions on File Prior to Visit   Medication Sig Dispense Refill    amLODIPine (NORVASC) 5 mg tablet Take one half tablet at night if systolic blood pressure greater than 140 90 Tab 1    tamsulosin (FLOMAX) 0.4 mg capsule TAKE 1 CAPSULE BY MOUTH  DAILY 90 Cap 1    atorvastatin (LIPITOR) 10 mg tablet Take 1 tablet by mouth  daily 90 Tab 2    ketoconazole (NIZORAL) 2 % topical cream Daily as needed 60 g 5    lisinopril (PRINIVIL, ZESTRIL) 40 mg tablet Take 1 tablet by mouth  daily for hypertension 90 Tab 1    glucose blood VI test strips (FREESTYLE LITE STRIPS) strip Test sugar daily DX E 11.9 100 Strip 5    naproxen sodium (NAPROSYN) 220 mg tablet Take 220 mg by mouth daily as needed.  aspirin 81 mg tablet Take 81 mg by mouth daily.  loperamide (IMMODIUM) 2 mg tablet Take 4 mg by mouth Before breakfast, lunch, and dinner. And with snacks        No current facility-administered medications on file prior to visit. No Known Allergies    Objective: There were no vitals taken for this visit. There is no height or weight on file to calculate BMI. Assessment of cognitive impairment: Alert and oriented x 3    Depression Screen:   PHQ over the last two weeks 5/29/2018   Little interest or pleasure in doing things Not at all   Feeling down, depressed or hopeless Not at all   Total Score PHQ 2 0       Fall Risk Assessment:    Fall Risk Assessment, last 12 mths 5/29/2018   Able to walk? Yes   Fall in past 12 months? No       Functional Ability:   Does the patient exhibit a steady gait? No,was living on a boat and is a little unbalanced today   How long did it take the patient to get up and walk from a sitting position? 2 seconds   Is the patient self reliant?  (ie can do own laundry, meals, household chores)  yes     Does the patient handle his/her own medications? yes     Does the patient handle his/her own money? yes     Is the patients home safe (ie good lighting, handrails on stairs and bath, etc.)? yes     Did you notice or did patient express any hearing difficulties? no     Did you notice or did patient express any vision difficulties? Yes,up close ,has problems     Were distance and reading eye charts used?   no       Advance Care Planning:   Patient was offered the opportunity to discuss advance care planning:  yes     Does patient have an Advance Directive:  yes   If no, did you provide information on Caring Connections?  no       Plan: Hemoccult fit test ordered. Patient up-to-date with his eye exam.  Declining immunizations    No orders of the defined types were placed in this encounter. Health Maintenance   Topic Date Due    EYE EXAM RETINAL OR DILATED Q1  09/05/1955    MICROALBUMIN Q1  02/10/2018    FOBT Q 1 YEAR AGE 50-75  02/10/2018    MEDICARE YEARLY EXAM  03/14/2018    HEMOGLOBIN A1C Q6M  04/03/2018    Influenza Age 5 to Adult  08/01/2018    FOOT EXAM Q1  10/03/2018    LIPID PANEL Q1  10/03/2018    GLAUCOMA SCREENING Q2Y  02/06/2019    DTaP/Tdap/Td series (2 - Td) 02/06/2027    ZOSTER VACCINE AGE 60>  Addressed    Pneumococcal 65+ Low/Medium Risk  Addressed       *Patient verbalized understanding and agreement with the plan. A copy of the After Visit Summary with personalized health plan was given to the patient today.

## 2018-05-30 LAB
ALT SERPL-CCNC: 24 IU/L (ref 0–44)
APPEARANCE UR: ABNORMAL
AST SERPL-CCNC: 24 IU/L (ref 0–40)
BASOPHILS # BLD AUTO: 0 X10E3/UL (ref 0–0.2)
BASOPHILS NFR BLD AUTO: 1 %
BILIRUB UR QL STRIP: NEGATIVE
BUN SERPL-MCNC: 11 MG/DL (ref 8–27)
BUN/CREAT SERPL: 13 (ref 10–24)
CALCIUM SERPL-MCNC: 9 MG/DL (ref 8.6–10.2)
CHLORIDE SERPL-SCNC: 108 MMOL/L (ref 96–106)
CHOLEST SERPL-MCNC: 141 MG/DL (ref 100–199)
CO2 SERPL-SCNC: 24 MMOL/L (ref 18–29)
COLOR UR: YELLOW
CREAT SERPL-MCNC: 0.84 MG/DL (ref 0.76–1.27)
EOSINOPHIL # BLD AUTO: 0.6 X10E3/UL (ref 0–0.4)
EOSINOPHIL NFR BLD AUTO: 8 %
ERYTHROCYTE [DISTWIDTH] IN BLOOD BY AUTOMATED COUNT: 13.4 % (ref 12.3–15.4)
EST. AVERAGE GLUCOSE BLD GHB EST-MCNC: 131 MG/DL
GFR SERPLBLD CREATININE-BSD FMLA CKD-EPI: 100 ML/MIN/1.73
GFR SERPLBLD CREATININE-BSD FMLA CKD-EPI: 87 ML/MIN/1.73
GLUCOSE SERPL-MCNC: 105 MG/DL (ref 65–99)
GLUCOSE UR QL: NEGATIVE
HBA1C MFR BLD: 6.2 % (ref 4.8–5.6)
HCT VFR BLD AUTO: 47.7 % (ref 37.5–51)
HDLC SERPL-MCNC: 46 MG/DL
HGB BLD-MCNC: 16 G/DL (ref 13–17.7)
HGB UR QL STRIP: NEGATIVE
IMM GRANULOCYTES # BLD: 0 X10E3/UL (ref 0–0.1)
IMM GRANULOCYTES NFR BLD: 0 %
INTERPRETATION, 910389: NORMAL
KETONES UR QL STRIP: NEGATIVE
LDLC SERPL CALC-MCNC: 79 MG/DL (ref 0–99)
LEUKOCYTE ESTERASE UR QL STRIP: NEGATIVE
LYMPHOCYTES # BLD AUTO: 1.8 X10E3/UL (ref 0.7–3.1)
LYMPHOCYTES NFR BLD AUTO: 24 %
MCH RBC QN AUTO: 32.4 PG (ref 26.6–33)
MCHC RBC AUTO-ENTMCNC: 33.5 G/DL (ref 31.5–35.7)
MCV RBC AUTO: 97 FL (ref 79–97)
MICRO URNS: ABNORMAL
MONOCYTES # BLD AUTO: 0.8 X10E3/UL (ref 0.1–0.9)
MONOCYTES NFR BLD AUTO: 10 %
NEUTROPHILS # BLD AUTO: 4.4 X10E3/UL (ref 1.4–7)
NEUTROPHILS NFR BLD AUTO: 57 %
NITRITE UR QL STRIP: NEGATIVE
PH UR STRIP: 5 [PH] (ref 5–7.5)
PLATELET # BLD AUTO: 156 X10E3/UL (ref 150–379)
POTASSIUM SERPL-SCNC: 4.6 MMOL/L (ref 3.5–5.2)
PROT UR QL STRIP: NEGATIVE
PSA SERPL-MCNC: 1 NG/ML (ref 0–4)
RBC # BLD AUTO: 4.94 X10E6/UL (ref 4.14–5.8)
SODIUM SERPL-SCNC: 141 MMOL/L (ref 134–144)
SP GR UR: 1.02 (ref 1–1.03)
TRIGL SERPL-MCNC: 79 MG/DL (ref 0–149)
TSH SERPL DL<=0.005 MIU/L-ACNC: 2.58 UIU/ML (ref 0.45–4.5)
UROBILINOGEN UR STRIP-MCNC: 0.2 MG/DL (ref 0.2–1)
VLDLC SERPL CALC-MCNC: 16 MG/DL (ref 5–40)
WBC # BLD AUTO: 7.6 X10E3/UL (ref 3.4–10.8)

## 2018-05-30 NOTE — PROGRESS NOTES
Recent lab work reviewed  All lab tests look good, within acceptable limits  sugars improved with A1c down to 6.2  Lipid panel improved  Patient should continue the current medications  Continue healthy diet    Keep planned follow-up

## 2018-06-14 LAB — HEMOCCULT STL QL IA: NEGATIVE

## 2018-06-20 RX ORDER — KETOCONAZOLE 20 MG/G
CREAM TOPICAL
Qty: 60 G | Refills: 5 | Status: SHIPPED | OUTPATIENT
Start: 2018-06-20 | End: 2020-08-30 | Stop reason: SDUPTHER

## 2018-09-25 DIAGNOSIS — E78.5 HYPERLIPIDEMIA, UNSPECIFIED HYPERLIPIDEMIA TYPE: ICD-10-CM

## 2018-09-25 RX ORDER — ATORVASTATIN CALCIUM 10 MG/1
TABLET, FILM COATED ORAL
Qty: 90 TAB | Refills: 0 | Status: SHIPPED | OUTPATIENT
Start: 2018-09-25 | End: 2018-09-25 | Stop reason: SDUPTHER

## 2018-09-25 NOTE — TELEPHONE ENCOUNTER
Requested Prescriptions     Pending Prescriptions Disp Refills    atorvastatin (LIPITOR) 10 mg tablet 90 Tab 1     Sig: Take 1 tablet by mouth  daily

## 2018-09-25 NOTE — TELEPHONE ENCOUNTER
Left detailed message for pt to call back to schedule f/u sometime in October w/Dr. Yoni Lira re: cholesterol; pt notified that his Rx is at pharmacy for him to

## 2020-01-25 ENCOUNTER — HOSPITAL ENCOUNTER (INPATIENT)
Age: 75
LOS: 2 days | Discharge: HOME | DRG: 299 | End: 2020-01-27
Attending: INTERNAL MEDICINE | Admitting: INTERNAL MEDICINE
Payer: MEDICARE

## 2020-01-25 ENCOUNTER — APPOINTMENT (OUTPATIENT)
Dept: ULTRASOUND IMAGING | Age: 75
DRG: 299 | End: 2020-01-25
Attending: INTERNAL MEDICINE
Payer: MEDICARE

## 2020-01-25 ENCOUNTER — APPOINTMENT (OUTPATIENT)
Dept: NON INVASIVE DIAGNOSTICS | Age: 75
DRG: 299 | End: 2020-01-25
Attending: INTERNAL MEDICINE
Payer: MEDICARE

## 2020-01-25 PROBLEM — I26.99 PE (PULMONARY THROMBOEMBOLISM) (HCC): Status: ACTIVE | Noted: 2020-01-25

## 2020-01-25 LAB
ANION GAP SERPL CALC-SCNC: 7 MMOL/L (ref 5–15)
BUN SERPL-MCNC: 12 MG/DL (ref 6–20)
BUN/CREAT SERPL: 12 (ref 12–20)
CALCIUM SERPL-MCNC: 8.4 MG/DL (ref 8.5–10.1)
CHLORIDE SERPL-SCNC: 111 MMOL/L (ref 97–108)
CO2 SERPL-SCNC: 23 MMOL/L (ref 21–32)
CREAT SERPL-MCNC: 0.99 MG/DL (ref 0.7–1.3)
ERYTHROCYTE [DISTWIDTH] IN BLOOD BY AUTOMATED COUNT: 12 % (ref 11.5–14.5)
GLUCOSE SERPL-MCNC: 146 MG/DL (ref 65–100)
HCT VFR BLD AUTO: 41.5 % (ref 36.6–50.3)
HGB BLD-MCNC: 14.1 G/DL (ref 12.1–17)
MCH RBC QN AUTO: 32.8 PG (ref 26–34)
MCHC RBC AUTO-ENTMCNC: 34 G/DL (ref 30–36.5)
MCV RBC AUTO: 96.5 FL (ref 80–99)
NRBC # BLD: 0 K/UL (ref 0–0.01)
NRBC BLD-RTO: 0 PER 100 WBC
PLATELET # BLD AUTO: 193 K/UL (ref 150–400)
PMV BLD AUTO: 10.8 FL (ref 8.9–12.9)
POTASSIUM SERPL-SCNC: 3.8 MMOL/L (ref 3.5–5.1)
RBC # BLD AUTO: 4.3 M/UL (ref 4.1–5.7)
SODIUM SERPL-SCNC: 141 MMOL/L (ref 136–145)
TROPONIN I SERPL-MCNC: 0.05 NG/ML
TROPONIN I SERPL-MCNC: 0.1 NG/ML
WBC # BLD AUTO: 9.7 K/UL (ref 4.1–11.1)

## 2020-01-25 PROCEDURE — 93970 EXTREMITY STUDY: CPT

## 2020-01-25 PROCEDURE — 80048 BASIC METABOLIC PNL TOTAL CA: CPT

## 2020-01-25 PROCEDURE — 65660000001 HC RM ICU INTERMED STEPDOWN

## 2020-01-25 PROCEDURE — 94760 N-INVAS EAR/PLS OXIMETRY 1: CPT

## 2020-01-25 PROCEDURE — 84484 ASSAY OF TROPONIN QUANT: CPT

## 2020-01-25 PROCEDURE — 74011250636 HC RX REV CODE- 250/636: Performed by: INTERNAL MEDICINE

## 2020-01-25 PROCEDURE — 77010033678 HC OXYGEN DAILY

## 2020-01-25 PROCEDURE — 74011250637 HC RX REV CODE- 250/637: Performed by: INTERNAL MEDICINE

## 2020-01-25 PROCEDURE — 36415 COLL VENOUS BLD VENIPUNCTURE: CPT

## 2020-01-25 PROCEDURE — 85027 COMPLETE CBC AUTOMATED: CPT

## 2020-01-25 RX ORDER — ATORVASTATIN CALCIUM 10 MG/1
10 TABLET, FILM COATED ORAL DAILY
Status: DISCONTINUED | OUTPATIENT
Start: 2020-01-25 | End: 2020-01-27 | Stop reason: HOSPADM

## 2020-01-25 RX ORDER — ONDANSETRON 2 MG/ML
4 INJECTION INTRAMUSCULAR; INTRAVENOUS
Status: DISCONTINUED | OUTPATIENT
Start: 2020-01-25 | End: 2020-01-27 | Stop reason: HOSPADM

## 2020-01-25 RX ORDER — TAMSULOSIN HYDROCHLORIDE 0.4 MG/1
0.4 CAPSULE ORAL DAILY
Status: DISCONTINUED | OUTPATIENT
Start: 2020-01-25 | End: 2020-01-27 | Stop reason: HOSPADM

## 2020-01-25 RX ORDER — LABETALOL HYDROCHLORIDE 5 MG/ML
10 INJECTION, SOLUTION INTRAVENOUS
Status: DISCONTINUED | OUTPATIENT
Start: 2020-01-25 | End: 2020-01-27 | Stop reason: HOSPADM

## 2020-01-25 RX ORDER — SODIUM CHLORIDE 0.9 % (FLUSH) 0.9 %
5-40 SYRINGE (ML) INJECTION AS NEEDED
Status: DISCONTINUED | OUTPATIENT
Start: 2020-01-25 | End: 2020-01-27 | Stop reason: HOSPADM

## 2020-01-25 RX ORDER — ACETAMINOPHEN 325 MG/1
650 TABLET ORAL
Status: DISCONTINUED | OUTPATIENT
Start: 2020-01-25 | End: 2020-01-27 | Stop reason: HOSPADM

## 2020-01-25 RX ORDER — ENOXAPARIN SODIUM 100 MG/ML
1 INJECTION SUBCUTANEOUS EVERY 12 HOURS
Status: DISCONTINUED | OUTPATIENT
Start: 2020-01-25 | End: 2020-01-26

## 2020-01-25 RX ORDER — LISINOPRIL 5 MG/1
10 TABLET ORAL DAILY
Status: DISCONTINUED | OUTPATIENT
Start: 2020-01-25 | End: 2020-01-27 | Stop reason: HOSPADM

## 2020-01-25 RX ORDER — SODIUM CHLORIDE 0.9 % (FLUSH) 0.9 %
5-40 SYRINGE (ML) INJECTION EVERY 8 HOURS
Status: DISCONTINUED | OUTPATIENT
Start: 2020-01-25 | End: 2020-01-27 | Stop reason: HOSPADM

## 2020-01-25 RX ORDER — GUAIFENESIN 100 MG/5ML
81 LIQUID (ML) ORAL DAILY
Status: DISCONTINUED | OUTPATIENT
Start: 2020-01-25 | End: 2020-01-27 | Stop reason: HOSPADM

## 2020-01-25 RX ADMIN — ENOXAPARIN SODIUM 100 MG: 100 INJECTION SUBCUTANEOUS at 07:10

## 2020-01-25 RX ADMIN — ENOXAPARIN SODIUM 100 MG: 100 INJECTION SUBCUTANEOUS at 18:11

## 2020-01-25 RX ADMIN — Medication 10 ML: at 18:11

## 2020-01-25 RX ADMIN — Medication 10 ML: at 22:00

## 2020-01-25 RX ADMIN — TAMSULOSIN HYDROCHLORIDE 0.4 MG: 0.4 CAPSULE ORAL at 08:34

## 2020-01-25 RX ADMIN — LISINOPRIL 10 MG: 5 TABLET ORAL at 07:04

## 2020-01-25 RX ADMIN — ASPIRIN 81 MG 81 MG: 81 TABLET ORAL at 08:34

## 2020-01-25 RX ADMIN — Medication 10 ML: at 06:00

## 2020-01-25 RX ADMIN — ATORVASTATIN CALCIUM 10 MG: 10 TABLET, FILM COATED ORAL at 08:34

## 2020-01-25 NOTE — PROGRESS NOTES
Seen pt today. 2 L NC. SOB improved. Vitals stable. exam unchanged    Bilateral PE- Continue lovenox, discussed with Dr Darek Alvarado.  -Likely provoked.   ECHO pending  Doppler neg for DVT

## 2020-01-25 NOTE — PROGRESS NOTES
Problem: Falls - Risk of  Goal: *Absence of Falls  Description  Document Juan Carlos Livier Fall Risk and appropriate interventions in the flowsheet.   Outcome: Progressing Towards Goal  Note: Fall Risk Interventions:            Medication Interventions: Bed/chair exit alarm, Patient to call before getting OOB, Teach patient to arise slowly                   Problem: Patient Education: Go to Patient Education Activity  Goal: Patient/Family Education  Outcome: Progressing Towards Goal     Problem: Pulmonary Embolism Care Plan (Adult)  Goal: *Improvement of existing pulmonary embolism  Outcome: Progressing Towards Goal  Goal: *Absence of bleeding  Outcome: Progressing Towards Goal  Goal: *Labs within defined limits  Outcome: Progressing Towards Goal     Problem: Hypertension  Goal: *Blood pressure within specified parameters  Outcome: Progressing Towards Goal  Goal: *Fluid volume balance  Outcome: Progressing Towards Goal  Goal: *Labs within defined limits  Outcome: Progressing Towards Goal

## 2020-01-25 NOTE — CONSULTS
[facility]  Pulmonary, Critical Care, and Sleep Medicine    Initial Patient Consult    Name: Lan Ernst MRN: [id number]   : 1945 Hospital: [facility]   Date: 2020        IMPRESSION:   · Bilateral Pulmonary Emboli. Hemodynamically stable. No evidence of DVT. · Hx of Colon Carcinoma, resected per Dr. Jada Chen, Has as ostomy in place. · Had recent kidney stone with lithotripsy. · No hx of bleeding or clotting issues in pt. No family hx of clotting issues. · Baseline Hypertension, has increased BPs. · Increased lipids  · He did travel by car around [place]. RECOMMENDATIONS:   · Bedrest today  · Monitor Hemodynamics, Oxygen levels  · On Enoxaparin, anticipate change to [address] [place] [address] in next 24-48 hrs. · Will follow along with you. Subjective: This patient has been seen and evaluated at the request of Dr. Lorenzo Dickinson for above. Patient is a 75 y.o. male who had increased shortness of breath, had recent lithotripsy. Has not as active for last week. Had recently traveled by car done to [place], [place]. Had been noting increased dyspnea for about 3 days. Past Medical History:   Diagnosis Date    Cancer [facility])     colon    Controlled type 2 diabetes mellitus without complication, without long-term current use of insulin ([address] 2017    Diabetes ([facility] [address]     Hypertension       Past Surgical History:   Procedure Laterality Date    ABDOMEN SURGERY PROC UNLISTED      ileostomy    HX GI  13     REVISION / REPAIR ILEOSTOMY HERNIA    HX LITHOTRIPSY Left 2020    HX OTHER SURGICAL      vasectomy      Prior to Admission medications    Medication Sig Start Date End Date Taking? Authorizing Provider   lisinopril (PRINIVIL, ZESTRIL) 10 mg tablet Take 1 Tab by mouth daily. 10/4/19  Yes Soledad Ziegler MD   atorvastatin (LIPITOR) 10 mg tablet Take 1 Tab by mouth daily.  19  Yes Soledad Ziegler MD   ketoconazole (NIZORAL) 2 % topical cream Daily as needed 6/20/18  Yes Blake Jarvis MD   aspirin 81 mg tablet Take 81 mg by mouth daily. Yes Provider, Historical   ondansetron (ZOFRAN ODT) 4 mg disintegrating tablet Take 1 Tab by mouth every eight (8) hours as needed for Nausea. 1/11/20   Uvaldo Benjamin MD   tamsulosin (FLOMAX) 0.4 mg capsule TAKE 1 CAPSULE BY MOUTH  DAILY 5/29/19   Donte Hazel MD   Blood-Glucose Meter monitoring kit Test sugars daily 11/21/18   Blake Jarvis MD   glucose blood VI test strips (FREESTYLE LITE STRIPS) strip Test sugar daily DX E 11.9 11/21/18   Blake Jarvis MD   Lancets misc Test sugar daily 11/21/18   Blake Jarvis MD   [facility] SENSIMIST 27.5 mcg/actuation nasal spray U 2 SPRAYS IEN ONCE D 4/28/18   Provider, Historical   loperamide (IMMODIUM) 2 mg tablet Take 4 mg by mouth three (3) times daily as needed. And with snacks     Provider, Historical     No Known Allergies   Social History     Tobacco Use    Smoking status: Never Smoker    Smokeless tobacco: Never Used   Substance Use Topics    Alcohol use: Yes     Comment: very rare      Family History   Problem Relation Age of Onset    Alzheimer Mother     Cancer Brother         lung        Current Facility-Administered Medications   Medication Dose Route Frequency    sodium chloride (NS) flush 5-40 mL  5-40 mL IntraVENous Q8H    enoxaparin (LOVENOX) injection 100 mg  1 mg/kg SubCUTAneous Q12H    lisinopril (PRINIVIL, ZESTRIL) tablet 10 mg  10 mg Oral DAILY    atorvastatin (LIPITOR) tablet 10 mg  10 mg Oral DAILY    tamsulosin (FLOMAX) capsule 0.4 mg  0.4 mg Oral DAILY    aspirin chewable tablet 81 mg  81 mg Oral DAILY       Review of Systems:  A comprehensive review of systems was negative.     Objective:   Vital Signs:    Visit Vitals  BP (!) 155/100 (BP 1 Location: Left arm, BP Patient Position: At rest)   Pulse 77   Temp 97.3 °F (36.3 °C)   Resp 18   Wt 101.6 kg (224 lb)   SpO2 98%   BMI 31.24 kg/m²       O2 Device: Nasal cannula   O2 Flow Rate (L/min): 2 l/min   Temp (24hrs), Av.3 °F (36.8 °C), Min:97.3 °F (36.3 °C), Max:99.7 °F (37.6 °C)       Intake/Output:   Last shift:      No intake/output data recorded. Last 3 shifts:  190 -  0700  In: 200 [P.O.:200]  Out: 600 [Urine:600]    Intake/Output Summary (Last 24 hours) at 2020 0918  Last data filed at 2020 0711  Gross per 24 hour   Intake 200 ml   Output 600 ml   Net -400 ml      Physical Exam:  On NC oxgyen. BP noted. General:  Alert, cooperative, no distress, appears stated age. Head:  Normocephalic, without obvious abnormality, atraumatic. Eyes:  Conjunctivae/corneas clear. PERRL, EOMs intact. Nose: Nares normal. Septum midline. Mucosa normal. No drainage or sinus tenderness. Throat: Lips, mucosa, and tongue normal. Teeth and gums normal.   Neck: Supple, symmetrical, trachea midline, no adenopathy, thyroid: no enlargment/tenderness/nodules, no carotid bruit and no JVD. Back:   Symmetric, no curvature. ROM normal.   Lungs:   Clear to auscultation bilaterally. Chest wall:  No tenderness or deformity. Heart:  Regular rate and rhythm, S1, S2 normal, no murmur, click, rub or gallop. Abdomen:   Soft, non-tender. Bowel sounds normal. No masses,  No organomegaly. Has a well healed midline incision and RLQ ostomy. Extremities: Extremities normal, atraumatic, no cyanosis or edema. Pulses: 2+ and symmetric all extremities. Skin: Skin color, texture, turgor normal. No rashes or lesions   Lymph nodes: Cervical, supraclavicular, and axillary nodes normal.   Neurologic: Grossly nonfocal     Data review:     Recent Results (from the past 24 hour(s))   CBC WITH AUTOMATED DIFF    Collection Time: 20  4:30 PM   Result Value Ref Range    WBC 10.2 4.1 - 11.1 K/uL    RBC 4.80 4. 10 - 5.70 M/uL    HGB 15.6 12.1 - 17.0 g/dL    HCT 46.9 36.6 - 50.3 %    MCV 97.7 80.0 - 99.0 FL    MCH 32.5 26.0 - 34.0 PG    MCHC 33.3 30.0 - 36.5 g/dL RDW 11.7 11.5 - 14.5 %    PLATELET 126 625 - 531 K/uL    MPV 10.4 8.9 - 12.9 FL    NRBC 0.0 0  WBC    ABSOLUTE NRBC 0.00 0.00 - 0.01 K/uL    NEUTROPHILS 72 32 - 75 %    LYMPHOCYTES 13 12 - 49 %    MONOCYTES 7 5 - 13 %    EOSINOPHILS 5 0 - 7 %    BASOPHILS 1 0 - 1 %    IMMATURE GRANULOCYTES 2 (H) 0.0 - 0.5 %    ABS. NEUTROPHILS 7.4 1.8 - 8.0 K/UL    ABS. LYMPHOCYTES 1.3 0.8 - 3.5 K/UL    ABS. MONOCYTES 0.7 0.0 - 1.0 K/UL    ABS. EOSINOPHILS 0.5 (H) 0.0 - 0.4 K/UL    ABS. BASOPHILS 0.1 0.0 - 0.1 K/UL    ABS. IMM. GRANS. 0.2 (H) 0.00 - 0.04 K/UL    DF AUTOMATED     METABOLIC PANEL, COMPREHENSIVE    Collection Time: 01/24/20  4:30 PM   Result Value Ref Range    Sodium 142 136 - 145 mmol/L    Potassium 3.7 3.5 - 5.1 mmol/L    Chloride 105 97 - 108 mmol/L    CO2 27 21 - 32 mmol/L    Anion gap 10 5 - 15 mmol/L    Glucose 196 (H) 65 - 100 mg/dL    BUN 13 6 - 20 MG/DL    Creatinine 1.17 0.70 - 1.30 MG/DL    BUN/Creatinine ratio 11 (L) 12 - 20      GFR est AA >60 >60 ml/min/1.73m2    GFR est non-AA >60 >60 ml/min/1.73m2    Calcium 8.8 8.5 - 10.1 MG/DL    Bilirubin, total 0.6 0.2 - 1.0 MG/DL    ALT (SGPT) 32 12 - 78 U/L    AST (SGOT) 27 15 - 37 U/L    Alk.  phosphatase 95 45 - 117 U/L    Protein, total 6.7 6.4 - 8.2 g/dL    Albumin 3.1 (L) 3.5 - 5.0 g/dL    Globulin 3.6 2.0 - 4.0 g/dL    A-G Ratio 0.9 (L) 1.1 - 2.2     TROPONIN I    Collection Time: 01/24/20  4:30 PM   Result Value Ref Range    Troponin-I, Qt. 0.11 (H) <0.05 ng/mL   D DIMER    Collection Time: 01/24/20  4:30 PM   Result Value Ref Range    D-dimer 27.94 (H) 0.00 - 0.65 mg/L FEU   NT-PRO BNP    Collection Time: 01/24/20  4:30 PM   Result Value Ref Range    NT pro-BNP 7,148 (H) 0 - 450 PG/ML   CK W/ REFLX CKMB    Collection Time: 01/24/20  4:30 PM   Result Value Ref Range    CK 49 39 - 177 U/L   METABOLIC PANEL, BASIC    Collection Time: 01/25/20  3:21 AM   Result Value Ref Range    Sodium 141 136 - 145 mmol/L    Potassium 3.8 3.5 - 5.1 mmol/L    Chloride 111 (H) 97 - 108 mmol/L    CO2 23 21 - 32 mmol/L    Anion gap 7 5 - 15 mmol/L    Glucose 146 (H) 65 - 100 mg/dL    BUN 12 6 - 20 MG/DL    Creatinine 0.99 0.70 - 1.30 MG/DL    BUN/Creatinine ratio 12 12 - 20      GFR est AA >60 >60 ml/min/1.73m2    GFR est non-AA >60 >60 ml/min/1.73m2    Calcium 8.4 (L) 8.5 - 10.1 MG/DL   CBC W/O DIFF    Collection Time: 01/25/20  3:21 AM   Result Value Ref Range    WBC 9.7 4.1 - 11.1 K/uL    RBC 4.30 4. 10 - 5.70 M/uL    HGB 14.1 12.1 - 17.0 g/dL    HCT 41.5 36.6 - 50.3 %    MCV 96.5 80.0 - 99.0 FL    MCH 32.8 26.0 - 34.0 PG    MCHC 34.0 30.0 - 36.5 g/dL    RDW 12.0 11.5 - 14.5 %    PLATELET 998 521 - 112 K/uL    MPV 10.8 8.9 - 12.9 FL    NRBC 0.0 0  WBC    ABSOLUTE NRBC 0.00 0.00 - 0.01 K/uL   TROPONIN I    Collection Time: 01/25/20  3:21 AM   Result Value Ref Range    Troponin-I, Qt. 0.10 (H) <0.05 ng/mL       Imaging:  I have personally reviewed the patients radiographs and have reviewed the reports:  1-24-20: FINDINGS:  The lungs are clear of mass, nodule, airspace disease or edema.     The pulmonary arteries are remarkable for massive bilateral saddle type  pulmonary emboli extending into all lobes, with the greatest burden in the left  lower lobe and right lower lobe. There are no peripheral pulmonary infarctions. There is no definite right heart strain.     There is no mediastinal or hilar adenopathy or mass. The aorta enhances normally  without evidence of aneurysm or dissection.     The visualized portions of the upper abdominal organs are normal.     IMPRESSION  IMPRESSION: Massive bilateral pulmonary emboli.          Talisha Dorado MD

## 2020-01-25 NOTE — PROGRESS NOTES
Bedside shift change report GIVEN TO Lana Maier RN. Report included the following information SBAR. SHIFT SUMMARY:      Maintained pt on bedrest throughout shift. Pt denied SOB or chest pain throughout shift. CONCERNS TO ADDRESS WITH MD:            [facility] NURSING NOTE   Admission Date 1/25/2020   Admission Diagnosis PE (pulmonary thromboembolism) ([facility] [address] [I26.99]   Consults IP CONSULT TO PULMONOLOGY      Cardiac Monitoring [x] Yes [] No      Purposeful Hourly Rounding [x] Yes    Tequila Score Total Score: 1   Tequila score 3 or > [x] Bed Alarm [] Avasys [] 1:1 sitter [] Patient refused (Signed refusal form in chart)   Samy Score Samy Score: 20   Samy score 14 or < [] PMT consult [] Wound Care consult    []  Specialty bed  [] Nutrition consult      Influenza Vaccine Received Flu Vaccine for Current Season (usually Sept-March): Yes           Oxygen needs? [x] Room air Oxygen @  []1L    []2L    []3L   []4L    []5L   []6L via  NC   Chronic home O2 use? [] Yes [] No  Perform O2 challenge test and document in progress note using [email or web address] (.Homeoxygen)      Last bowel movement Last Bowel Movement Date: 01/25/20      Urinary Catheter             LDAs               Peripheral IV 01/24/20 Right Antecubital (Active)   Site Assessment Clean, dry, & intact 1/25/2020  1:40 PM   Phlebitis Assessment 0 1/25/2020  1:40 PM   Infiltration Assessment 0 1/25/2020  1:40 PM   Dressing Status Clean, dry, & intact 1/25/2020  1:40 PM   Dressing Type Transparent 1/25/2020  1:40 PM   Hub Color/Line Status Green;Flushed 1/25/2020  1:40 PM   Alcohol Cap Used Yes 1/25/2020  1:40 PM                         Readmission Risk Assessment Tool Score Low Risk            9       Total Score        5 Pt. Coverage (Medicare=5 , Medicaid, or Self-Pay=4)    4 Charlson Comorbidity Score (Age + Comorbid Conditions)        Criteria that do not apply:    Has Seen PCP in Last 6 Months (Yes=3, No=0)    . Living with Significant Other. Assisted Living. LTAC. SNF.  or   Rehab    Patient Length of Stay (>5 days = 3)    IP Visits Last 12 Months (1-3=4, 4=9, >4=11)       Expected Length of Stay - - -   Actual Length of Stay 0

## 2020-01-25 NOTE — H&P
Hospitalist Admission Note    NAME: Tameka Kenney   :  [birth year]   MRN:  [id number]     Date/Time:  2020 4:18 AM    Patient PCP: Ruby Cranker, MD  ______________________________________________________________________  Given the patient's current clinical presentation, I have a high level of concern for decompensation if discharged from the emergency department. Complex decision making was performed, which includes reviewing the patient's available past medical records, laboratory results, and x-ray films. My assessment of this patient's clinical condition and my plan of care is as follows. Assessment / Plan:    Massive bilateral pulmonary emboli   Admit patient to stepdown   Follow-up serial troponin  echocardiogram  Pulmonary consultation  start patient on Lovenoxfollow-up venous Doppler    Hypertension  continue home antihypertensive medication    Hyperlipidemia  Continue Lipitor    BPH  Continue Flomax          Code Status: Full   Surrogate Decision Maker:Miroslava Leon    DVT Prophylaxis: Lovenox   GI Prophylaxis: not indicated          Subjective:   CHIEF COMPLAINT: SOB     HISTORY OF PRESENT ILLNESS:     75years old male with past medical history significant for colon cancer, DM, hypertension, hyperlipidemia was transferred from [facility] for evaluation of shortness of breath, patient been complaining from shortness of breath for 3 days denies any fever any chills any cough, patient had lithotripsy about 1 week ago and since then she been complaining from worsening shortness of breath, CTA chest was done and showed massive bilateral PE. We were asked to admit for work up and evaluation of the above problems.      Past Medical History:   Diagnosis Date    Cancer [facility])     colon    Controlled type 2 diabetes mellitus without complication, without long-term current use of insulin ([address] 2017    Diabetes ([facility] [address]     Hypertension         Past Surgical History:   Procedure Laterality Date    ABDOMEN SURGERY PROC UNLISTED  2009    ileostomy    HX GI  2/11/13     REVISION / REPAIR ILEOSTOMY HERNIA    HX LITHOTRIPSY Left 01/21/2020    HX OTHER SURGICAL      vasectomy       Social History     Tobacco Use    Smoking status: Never Smoker    Smokeless tobacco: Never Used   Substance Use Topics    Alcohol use: Yes     Comment: very rare        Family History   Problem Relation Age of Onset    Alzheimer Mother    José Manuel Valle Cancer Brother         lung     No Known Allergies     Prior to Admission medications    Medication Sig Start Date End Date Taking? Authorizing Provider   lisinopril (PRINIVIL, ZESTRIL) 10 mg tablet Take 1 Tab by mouth daily. 10/4/19  Yes Ad Coon MD   atorvastatin (LIPITOR) 10 mg tablet Take 1 Tab by mouth daily. 5/29/19  Yes Ad Coon MD   ketoconazole (NIZORAL) 2 % topical cream Daily as needed 6/20/18  Yes Luke Montenegro MD   aspirin 81 mg tablet Take 81 mg by mouth daily. Yes Provider, Historical   ondansetron (ZOFRAN ODT) 4 mg disintegrating tablet Take 1 Tab by mouth every eight (8) hours as needed for Nausea. 1/11/20   Frankie Briones MD   tamsulosin (FLOMAX) 0.4 mg capsule TAKE 1 CAPSULE BY MOUTH  DAILY 5/29/19   Ad Coon MD   Blood-Glucose Meter monitoring kit Test sugars daily 11/21/18   Luke Montenegro MD   glucose blood VI test strips (FREESTYLE LITE STRIPS) strip Test sugar daily DX E 11.9 11/21/18   Luke Montenegro MD   Lancets misc Test sugar daily 11/21/18   Luke Montenegro MD   [facility] SENSIMIST 27.5 mcg/actuation nasal spray U 2 SPRAYS IEN ONCE D 4/28/18   Provider, Historical   loperamide (IMMODIUM) 2 mg tablet Take 4 mg by mouth three (3) times daily as needed. And with snacks     Provider, Historical       REVIEW OF SYSTEMS:     I am not able to complete the review of systems because:    The patient is intubated and sedated    The patient has altered mental status due to his acute medical problems    The patient has baseline aphasia from prior stroke(s)    The patient has baseline dementia and is not reliable historian    The patient is in acute medical distress and unable to provide information           Total of 12 systems reviewed as follows:       POSITIVE= underlined text  Negative = text not underlined  General:  fever, chills, sweats, generalized weakness, weight loss/gain,      loss of appetite   Eyes:    blurred vision, eye pain, loss of vision, double vision  ENT:    rhinorrhea, pharyngitis   Respiratory:   cough, sputum production, SOB, BARBOSA, wheezing, pleuritic pain   Cardiology:   chest pain, palpitations, orthopnea, PND, edema, syncope   Gastrointestinal:  abdominal pain , N/V, diarrhea, dysphagia, constipation, bleeding   Genitourinary:  frequency, urgency, dysuria, hematuria, incontinence   Muskuloskeletal :  arthralgia, myalgia, back pain  Hematology:  easy bruising, nose or gum bleeding, lymphadenopathy   Dermatological: rash, ulceration, pruritis, color change / jaundice  Endocrine:   hot flashes or polydipsia   Neurological:  headache, dizziness, confusion, focal weakness, paresthesia,     Speech difficulties, memory loss, gait difficulty  Psychological: Feelings of anxiety, depression, agitation    Objective:   VITALS:    Visit Vitals  BP (!) 142/98   Pulse 88   Temp 97.8 °F (36.6 °C)   Resp 19   SpO2 97%       PHYSICAL EXAM:    General:    Alert, cooperative, no distress, appears stated age. HEENT: Atraumatic, anicteric sclerae, pink conjunctivae     No oral ulcers, mucosa moist, throat clear, dentition fair  Neck:  Supple, symmetrical,  thyroid: non tender  Lungs:   Clear to auscultation bilaterally. No Wheezing or Rhonchi. No rales. Chest wall:  No tenderness  No Accessory muscle use. Heart:   Regular  rhythm,  No  murmur   No edema  Abdomen:   Soft, non-tender. Not distended. Bowel sounds normal  Extremities: No cyanosis.   No clubbing,      Skin turgor normal, Capillary refill normal, Radial dial pulse 2+  Skin:     Not pale. Not Jaundiced  No rashes   Psych:  Good insight. Not depressed. Not anxious or agitated. Neurologic: EOMs intact. No facial asymmetry. No aphasia or slurred speech. Symmetrical strength, Sensation grossly intact. Alert and oriented X 4.     _______________________________________________________________________  Care Plan discussed with:    Comments   Patient y    Family      RN y    Care Manager                    Consultant:      _______________________________________________________________________  Expected  Disposition:   Home with Family y   HH/PT/OT/RN    SNF/LTC    [facility]    ________________________________________________________________________  TOTAL TIME: 61  Minutes    Critical Care Provided     Minutes non procedure based      Comments    y Reviewed previous records   >50% of visit spent in counseling and coordination of care y Discussion with patient and/or family and questions answered       ________________________________________________________________________  Signed: Hugh Corbett MD    Procedures: see electronic medical records for all procedures/Xrays and details which were not copied into this note but were reviewed prior to creation of Plan. LAB DATA REVIEWED:    Recent Results (from the past 24 hour(s))   CBC WITH AUTOMATED DIFF    Collection Time: 01/24/20  4:30 PM   Result Value Ref Range    WBC 10.2 4.1 - 11.1 K/uL    RBC 4.80 4. 10 - 5.70 M/uL    HGB 15.6 12.1 - 17.0 g/dL    HCT 46.9 36.6 - 50.3 %    MCV 97.7 80.0 - 99.0 FL    MCH 32.5 26.0 - 34.0 PG    MCHC 33.3 30.0 - 36.5 g/dL    RDW 11.7 11.5 - 14.5 %    PLATELET 001 023 - 422 K/uL    MPV 10.4 8.9 - 12.9 FL    NRBC 0.0 0  WBC    ABSOLUTE NRBC 0.00 0.00 - 0.01 K/uL    NEUTROPHILS 72 32 - 75 %    LYMPHOCYTES 13 12 - 49 %    MONOCYTES 7 5 - 13 %    EOSINOPHILS 5 0 - 7 %    BASOPHILS 1 0 - 1 %    IMMATURE GRANULOCYTES 2 (H) 0.0 - 0.5 % ABS. NEUTROPHILS 7.4 1.8 - 8.0 K/UL    ABS. LYMPHOCYTES 1.3 0.8 - 3.5 K/UL    ABS. MONOCYTES 0.7 0.0 - 1.0 K/UL    ABS. EOSINOPHILS 0.5 (H) 0.0 - 0.4 K/UL    ABS. BASOPHILS 0.1 0.0 - 0.1 K/UL    ABS. IMM. GRANS. 0.2 (H) 0.00 - 0.04 K/UL    DF AUTOMATED     METABOLIC PANEL, COMPREHENSIVE    Collection Time: 01/24/20  4:30 PM   Result Value Ref Range    Sodium 142 136 - 145 mmol/L    Potassium 3.7 3.5 - 5.1 mmol/L    Chloride 105 97 - 108 mmol/L    CO2 27 21 - 32 mmol/L    Anion gap 10 5 - 15 mmol/L    Glucose 196 (H) 65 - 100 mg/dL    BUN 13 6 - 20 MG/DL    Creatinine 1.17 0.70 - 1.30 MG/DL    BUN/Creatinine ratio 11 (L) 12 - 20      GFR est AA >60 >60 ml/min/1.73m2    GFR est non-AA >60 >60 ml/min/1.73m2    Calcium 8.8 8.5 - 10.1 MG/DL    Bilirubin, total 0.6 0.2 - 1.0 MG/DL    ALT (SGPT) 32 12 - 78 U/L    AST (SGOT) 27 15 - 37 U/L    Alk. phosphatase 95 45 - 117 U/L    Protein, total 6.7 6.4 - 8.2 g/dL    Albumin 3.1 (L) 3.5 - 5.0 g/dL    Globulin 3.6 2.0 - 4.0 g/dL    A-G Ratio 0.9 (L) 1.1 - 2.2     TROPONIN I    Collection Time: 01/24/20  4:30 PM   Result Value Ref Range    Troponin-I, Qt. 0.11 (H) <0.05 ng/mL   D DIMER    Collection Time: 01/24/20  4:30 PM   Result Value Ref Range    D-dimer 27.94 (H) 0.00 - 0.65 mg/L FEU   NT-PRO BNP    Collection Time: 01/24/20  4:30 PM   Result Value Ref Range    NT pro-BNP 7,148 (H) 0 - 450 PG/ML   CK W/ REFLX CKMB    Collection Time: 01/24/20  4:30 PM   Result Value Ref Range    CK 49 39 - 308 U/L   CBC W/O DIFF    Collection Time: 01/25/20  3:21 AM   Result Value Ref Range    WBC 9.7 4.1 - 11.1 K/uL    RBC 4.30 4. 10 - 5.70 M/uL    HGB 14.1 12.1 - 17.0 g/dL    HCT 41.5 36.6 - 50.3 %    MCV 96.5 80.0 - 99.0 FL    MCH 32.8 26.0 - 34.0 PG    MCHC 34.0 30.0 - 36.5 g/dL    RDW 12.0 11.5 - 14.5 %    PLATELET 589 856 - 714 K/uL    MPV 10.8 8.9 - 12.9 FL    NRBC 0.0 0  WBC    ABSOLUTE NRBC 0.00 0.00 - 0.01 K/uL

## 2020-01-25 NOTE — PROGRESS NOTES
Problem: Falls - Risk of  Goal: *Absence of Falls  Description  Document Filemonodalislelo Rebolledo Fall Risk and appropriate interventions in the flowsheet.   Outcome: Progressing Towards Goal  Note: Fall Risk Interventions:            Medication Interventions: Bed/chair exit alarm, Patient to call before getting OOB, Teach patient to arise slowly                   Problem: Hypertension  Goal: *Blood pressure within specified parameters  Outcome: Progressing Towards Goal  Goal: *Fluid volume balance  Outcome: Progressing Towards Goal  Goal: *Labs within defined limits  Outcome: Progressing Towards Goal     Problem: Pulmonary Embolism Care Plan (Adult)  Goal: *Improvement of existing pulmonary embolism  Outcome: Progressing Towards Goal  Goal: *Absence of bleeding  Outcome: Progressing Towards Goal  Goal: *Labs within defined limits  Outcome: Progressing Towards Goal

## 2020-01-25 NOTE — PROGRESS NOTES
Telephone report report 65 from Darya Clark from Soni Guillory RN     I did question elevated blood pressures to see if they could reevaluate for any antihypertensive prior to transfer Anat called I did request lisinopril to be tubed asap they said they will tube now   Blood pressure recycled and 142/98   Pt fast asleep soundly     Pt forgot his colostomy supplies ,I checked colostomy all looked good it was emptied prior transfer to ED [facility] wife will bring supplies in am     Bedside and Verbal shift change report GIVEN TO , RN. Report included the following information Kardex. SIGNIFICANT CHANGES DURING SHIFT:        CONCERNS TO ADDRESS WITH MD:            [facility] NURSING NOTE   Admission Date 1/25/2020   Admission Diagnosis PE (pulmonary thromboembolism) ([facility] [address] [I26.99]   Consults None      Cardiac Monitoring [x] Yes [] No      Purposeful Hourly Rounding [x] Yes    Tequila Score Total Score: 1   Tequila score 3 or > [x] Bed Alarm [] Avasys [] 1:1 sitter [] Patient refused (Signed refusal form in chart)   Samy Score Samy Score: 23   Samy score 14 or < [] PMT consult [] Wound Care consult    []  Specialty bed  [] Nutrition consult      Influenza Vaccine Received Flu Vaccine for Current Season (usually Sept-March): Yes           Oxygen needs? [] Room air Oxygen @  []1L    [x]2L    []3L   []4L    []5L   []6L via  NC   Chronic home O2 use?  [] Yes [x] No  Perform O2 challenge test and document in progress note using smartphrase (.Homeoxygen)      Last bowel movement Last Bowel Movement Date: 01/25/20      Urinary Catheter             LDAs               Peripheral IV 01/24/20 Right Antecubital (Active)   Site Assessment Clean, dry, & intact 1/25/2020  1:37 AM   Phlebitis Assessment 0 1/25/2020  1:37 AM   Infiltration Assessment 0 1/25/2020  1:37 AM   Dressing Status Clean, dry, & intact 1/25/2020  1:37 AM   Dressing Type Tape;Transparent 1/25/2020  1:37 AM   Hub Color/Line Status Green;Capped;Flushed;Patent 1/25/2020  1:37 AM                         Readmission Risk Assessment Tool Score Low Risk            9       Total Score        5 Pt. Coverage (Medicare=5 , Medicaid, or Self-Pay=4)    4 Charlson Comorbidity Score (Age + Comorbid Conditions)        Criteria that do not apply:    Has Seen PCP in Last 6 Months (Yes=3, No=0)    . Living with Significant Other. Assisted Living. LTAC. SNF.  or   Rehab    Patient Length of Stay (>5 days = 3)    IP Visits Last 12 Months (1-3=4, 4=9, >4=11)       Expected Length of Stay - - -   Actual Length of Stay 0

## 2020-01-26 ENCOUNTER — APPOINTMENT (OUTPATIENT)
Dept: NON INVASIVE DIAGNOSTICS | Age: 75
DRG: 299 | End: 2020-01-26
Attending: INTERNAL MEDICINE
Payer: MEDICARE

## 2020-01-26 LAB
ANION GAP SERPL CALC-SCNC: 5 MMOL/L (ref 5–15)
AV PEAK GRADIENT: 60.22 MMHG
AV VELOCITY RATIO: 0.79
BUN SERPL-MCNC: 12 MG/DL (ref 6–20)
BUN/CREAT SERPL: 12 (ref 12–20)
CALCIUM SERPL-MCNC: 8.4 MG/DL (ref 8.5–10.1)
CHLORIDE SERPL-SCNC: 111 MMOL/L (ref 97–108)
CO2 SERPL-SCNC: 22 MMOL/L (ref 21–32)
CREAT SERPL-MCNC: 1.04 MG/DL (ref 0.7–1.3)
ECHO AO ROOT DIAM: 3.6 CM
ECHO AV AREA PEAK VELOCITY: 3.8 CM2
ECHO AV AREA/BSA PEAK VELOCITY: 1.7 CM2/M2
ECHO AV CUSP MM: 2.3 CM
ECHO AV PEAK GRADIENT: 4.7 MMHG
ECHO AV PEAK VELOCITY: 108.06 CM/S
ECHO AV REGURGITANT PHT: 395.4 CM
ECHO EST RA PRESSURE: 10 MMHG
ECHO LA MAJOR AXIS: 4.53 CM
ECHO LA TO AORTIC ROOT RATIO: 1.26
ECHO LV E' SEPTAL VELOCITY: 3.26 CM/S
ECHO LV EDV TEICHHOLZ: 0.7 ML
ECHO LV ESV TEICHHOLZ: 0.35 ML
ECHO LV INTERNAL DIMENSION DIASTOLIC: 5.15 CM (ref 4.2–5.9)
ECHO LV INTERNAL DIMENSION SYSTOLIC: 3.84 CM
ECHO LV IVSD: 1.99 CM (ref 0.6–1)
ECHO LV IVSS: 2.26 CM
ECHO LV MASS 2D: 626.8 G (ref 88–224)
ECHO LV MASS INDEX 2D: 282 G/M2 (ref 49–115)
ECHO LV POSTERIOR WALL DIASTOLIC: 1.95 CM (ref 0.6–1)
ECHO LV POSTERIOR WALL SYSTOLIC: 2.34 CM
ECHO LVOT DIAM: 2.47 CM
ECHO LVOT PEAK GRADIENT: 2.9 MMHG
ECHO LVOT PEAK VELOCITY: 85.09 CM/S
ECHO MV A VELOCITY: 49.36 CM/S
ECHO MV E DECELERATION TIME (DT): 126.4 MS
ECHO MV E VELOCITY: 43.29 CM/S
ECHO MV E/A RATIO: 0.88
ECHO MV E/E' SEPTAL: 13.28
ERYTHROCYTE [DISTWIDTH] IN BLOOD BY AUTOMATED COUNT: 11.9 % (ref 11.5–14.5)
GLUCOSE SERPL-MCNC: 143 MG/DL (ref 65–100)
HCT VFR BLD AUTO: 42.4 % (ref 36.6–50.3)
HGB BLD-MCNC: 14.4 G/DL (ref 12.1–17)
LVFS 2D: 25.43 %
LVSV (TEICH): 27.87 ML
MCH RBC QN AUTO: 33 PG (ref 26–34)
MCHC RBC AUTO-ENTMCNC: 34 G/DL (ref 30–36.5)
MCV RBC AUTO: 97.2 FL (ref 80–99)
MV DEC SLOPE: 4.39
NRBC # BLD: 0 K/UL (ref 0–0.01)
NRBC BLD-RTO: 0 PER 100 WBC
PISA AR MAX VEL: 388 CM/S
PLATELET # BLD AUTO: 227 K/UL (ref 150–400)
PMV BLD AUTO: 10.5 FL (ref 8.9–12.9)
POTASSIUM SERPL-SCNC: 3.9 MMOL/L (ref 3.5–5.1)
PULMONARY ARTERY END DIASTOLIC PRESSURE: 14.9 MMHG
PV END DIASTOLIC VELOCITY: 1.1 MMHG
RBC # BLD AUTO: 4.36 M/UL (ref 4.1–5.7)
SODIUM SERPL-SCNC: 138 MMOL/L (ref 136–145)
WBC # BLD AUTO: 10.1 K/UL (ref 4.1–11.1)

## 2020-01-26 PROCEDURE — 80048 BASIC METABOLIC PNL TOTAL CA: CPT

## 2020-01-26 PROCEDURE — 36415 COLL VENOUS BLD VENIPUNCTURE: CPT

## 2020-01-26 PROCEDURE — 85027 COMPLETE CBC AUTOMATED: CPT

## 2020-01-26 PROCEDURE — 65660000001 HC RM ICU INTERMED STEPDOWN

## 2020-01-26 PROCEDURE — 93306 TTE W/DOPPLER COMPLETE: CPT

## 2020-01-26 PROCEDURE — 74011250636 HC RX REV CODE- 250/636: Performed by: INTERNAL MEDICINE

## 2020-01-26 PROCEDURE — 74011250637 HC RX REV CODE- 250/637: Performed by: INTERNAL MEDICINE

## 2020-01-26 RX ADMIN — ASPIRIN 81 MG 81 MG: 81 TABLET ORAL at 08:39

## 2020-01-26 RX ADMIN — Medication 10 ML: at 14:07

## 2020-01-26 RX ADMIN — ATORVASTATIN CALCIUM 10 MG: 10 TABLET, FILM COATED ORAL at 08:39

## 2020-01-26 RX ADMIN — Medication 10 ML: at 06:43

## 2020-01-26 RX ADMIN — TAMSULOSIN HYDROCHLORIDE 0.4 MG: 0.4 CAPSULE ORAL at 08:39

## 2020-01-26 RX ADMIN — ENOXAPARIN SODIUM 100 MG: 100 INJECTION SUBCUTANEOUS at 06:43

## 2020-01-26 RX ADMIN — Medication 10 ML: at 21:29

## 2020-01-26 RX ADMIN — LISINOPRIL 10 MG: 5 TABLET ORAL at 08:39

## 2020-01-26 RX ADMIN — APIXABAN 10 MG: 5 TABLET, FILM COATED ORAL at 17:19

## 2020-01-26 NOTE — PROGRESS NOTES
0700: Bedside shift change report given to Stacey RN (oncoming nurse) by Bandar Freeman RN (offgoing nurse). Report included the following information SBAR, Intake/Output, MAR, Recent Results and Cardiac Rhythm NSR.     0730: Spoke with Dr. Ina Manning regarding bedrest orders. Pt no longer SOB, requiring oxygen, or in any pain. Anticipate switching to PO anticoagulation tomorrow. Dr. Ina Manning will put in orders for activity. 0815: Pt ambulated to restroom, washed up, drained ostomy bag, and ambulated to chair. No complaints of SOB or pain during activity. 1600: Pt ambulating in hallway. No complaints of SOB or pain during walk. Ambulated for 30 minutes. 1900: Bedside shift change report given to Jr Oneill RN (oncoming nurse) by Jennifer Fernandez RN (offgoing nurse). Report included the following information SBAR, Intake/Output, MAR, Recent Results and Cardiac Rhythm NSR.

## 2020-01-26 NOTE — PROGRESS NOTES
Bedside and Verbal shift change report GIVEN TO , RN. Report included the following information Kardex. SIGNIFICANT CHANGES DURING SHIFT:        CONCERNS TO ADDRESS WITH MD:            [address] NURSING NOTE   Admission Date 1/25/2020   Admission Diagnosis PE (pulmonary thromboembolism) ([facility] [address] [I26.99]   Consults IP CONSULT TO PULMONOLOGY      Cardiac Monitoring [x] Yes [] No      Purposeful Hourly Rounding [x] Yes    Tequila Score Total Score: 1   Tequila score 3 or > [x] Bed Alarm [] Avasys [] 1:1 sitter [] Patient refused (Signed refusal form in chart)   Samy Score Samy Score: 20   Samy score 14 or < [] PMT consult [] Wound Care consult    []  Specialty bed  [] Nutrition consult      Influenza Vaccine Received Flu Vaccine for Current Season (usually Sept-March): Yes           Oxygen needs? [x] Room air Oxygen @  []1L    []2L    []3L   []4L    []5L   []6L via  NC   Chronic home O2 use? [] Yes [x] No  Perform O2 challenge test and document in progress note using smartphrase (.Homeoxygen)      Last bowel movement Last Bowel Movement Date: 01/25/20      Urinary Catheter             LDAs               Peripheral IV 01/24/20 Right Antecubital (Active)   Site Assessment Clean, dry, & intact 1/25/2020  7:26 PM   Phlebitis Assessment 0 1/25/2020  7:26 PM   Infiltration Assessment 0 1/25/2020  7:26 PM   Dressing Status Clean, dry, & intact 1/25/2020  7:26 PM   Dressing Type Tape;Transparent 1/25/2020  7:26 PM   Hub Color/Line Status Green;Capped;Flushed;Patent 1/25/2020  7:26 PM   Alcohol Cap Used Yes 1/25/2020  1:40 PM                         Readmission Risk Assessment Tool Score Low Risk            9       Total Score        5 Pt. Coverage (Medicare=5 , Medicaid, or Self-Pay=4)    4 Charlson Comorbidity Score (Age + Comorbid Conditions)        Criteria that do not apply:    Has Seen PCP in Last 6 Months (Yes=3, No=0)    . Living with Significant Other. Assisted Living. LTAC. SNF.  or   Rehab    Patient Length of Stay (>5 days = 3)    IP Visits Last 12 Months (1-3=4, 4=9, >4=11)       Expected Length of Stay - - -   Actual Length of Stay 0

## 2020-01-26 NOTE — PROGRESS NOTES
Hospitalist Progress Note    NAME: Bernarda Christiansen   :  [id number]   MRN:  [id number]       Assessment / Plan:  Massive bilateral pulmonary emboli POA  ? provoked  -Continue Lovenox, switch to Eliquis today  -ECHO pending  appreciate Pulmonary help  Doppler neg for DVT    Hypertension  continue home antihypertensive medication     Hyperlipidemia  Continue Lipitor     BPH  Continue Flomax      H?o Colon cancer -resected  Has ostomy     Recent kidney stone s/p lithotripsy     Code Status: Full   Surrogate Decision Maker:Miroslava Leon     DVT Prophylaxis: Lovenox   GI Prophylaxis: not indicated     Subjective:     Chief Complaint / Reason for Physician Visit  \"feels better, SOB improved. On RA now\". Discussed with RN events overnight. Review of Systems:  Symptom Y/N Comments  Symptom Y/N Comments   Fever/Chills n   Chest Pain n    Poor Appetite n   Edema n    Cough n   Abdominal Pain n    Sputum n   Joint Pain     SOB/BARBOSA y   Pruritis/Rash     Nausea/vomit    Tolerating PT/OT     Diarrhea    Tolerating Diet     Constipation    Other       Could NOT obtain due to:      Objective:     VITALS:   Last 24hrs VS reviewed since prior progress note.  Most recent are:  Patient Vitals for the past 24 hrs:   Temp Pulse Resp BP SpO2   20 1119  81   93 %   20 1117  84   93 %   20 1116  85   95 %   20 1115  78   94 %   20 1114 97.5 °F (36.4 °C) 80 20 104/85 93 %   20 1113  80   93 %   20 1112  82   92 %   20 1107  84   93 %   20 1106  82   92 %   20 1105  87   93 %   20 1103  87   92 %   20 1102  84   92 %   20 1101  87   95 %   20 1059  84   92 %   20 1058  84   93 %   20 1056  84   93 %   20 1055  84   93 %   20 1054  86   93 %   20 1053  85   93 %   20 1052  83   93 %   20 1051  85   93 %   20 1046  88   96 %   20 1045  87   94 %   01/26/20 1044  87   94 %   01/26/20 1042  84   95 %   01/26/20 1041  86   95 %   01/26/20 1040  89   95 %   01/26/20 1038  83   95 %   01/26/20 1037  87   95 %   01/26/20 1032  86   94 %   01/26/20 1031  87   94 %   01/26/20 1030  87   94 %   01/26/20 1027  85   94 %   01/26/20 1026  84   94 %   01/26/20 1025  82   94 %   01/26/20 1024  83   94 %   01/26/20 1023  87   94 %   01/26/20 1021  82   93 %   01/26/20 1020  86   93 %   01/26/20 1019  84   93 %   01/26/20 1014  78   94 %   01/26/20 1013  84   95 %   01/26/20 1012  79   94 %   01/26/20 1010  90   95 %   01/26/20 1009  84   94 %   01/26/20 1006  86   95 %   01/26/20 1005  83   95 %   01/26/20 1004  85   95 %   01/26/20 1003  84   95 %   01/26/20 1002  86   96 %   01/26/20 0959  85   94 %   01/26/20 0958  82   94 %   01/26/20 0956  85   96 %   01/26/20 0955  82   95 %   01/26/20 0953  86   96 %   01/26/20 0952  79   94 %   01/26/20 0951  76   94 %   01/26/20 0949  76   94 %   01/26/20 0948  80   94 %   01/26/20 0947  80   94 %   01/26/20 0943  76   94 %   01/26/20 0942  76   95 %   01/26/20 0941  75   95 %   01/26/20 0938  78   96 %   01/26/20 0937  83   96 %   01/26/20 0936  87   97 %   01/26/20 0935  89   97 %   01/26/20 0934  86   93 %   01/26/20 0931  96      01/26/20 0921  89      01/26/20 0920  93      01/26/20 0919  94      01/26/20 0917  83   95 %   01/26/20 0916  74   96 %   01/26/20 0914  81   93 %   01/26/20 0913  80   95 %   01/26/20 0912  77   95 %   01/26/20 0910  76   95 %   01/26/20 0909  77   94 %   01/26/20 0907  78   95 %   01/26/20 0906  76   95 %   01/26/20 0905  78   94 %   01/26/20 0903  78   95 %   01/26/20 0902  76   95 %   01/26/20 0901  76   95 %   01/26/20 0859  77   96 %   01/26/20 0858  86   95 %   01/26/20 0857  77   96 % 01/26/20 0856  78   96 %   01/26/20 0855  78   96 %   01/26/20 0853  75   96 %   01/26/20 0852  79   96 %   01/26/20 0851  76   96 %   01/26/20 0850  76   96 %   01/26/20 0849  78   96 %   01/26/20 0848  77   96 %   01/26/20 0843  84   95 %   01/26/20 0842  80   95 %   01/26/20 0841  79   95 %   01/26/20 0839  75   96 %   01/26/20 0838  82   96 %   01/26/20 0837  78   95 %   01/26/20 0835  76   95 %   01/26/20 0834  76   95 %   01/26/20 0832  77   95 %   01/26/20 0831  79   95 %   01/26/20 0830  76   96 %   01/26/20 0825  76   95 %   01/26/20 0824  76   96 %   01/26/20 0823  77   96 %   01/26/20 0821  80   96 %   01/26/20 0820  76   95 %   01/26/20 0818  75   96 %   01/26/20 0817  77   96 %   01/26/20 0816  78   96 %   01/26/20 0814  76   95 %   01/26/20 0813  76   95 %   01/26/20 0811  80   95 %   01/26/20 0810  74   95 %   01/26/20 0809  77   95 %   01/26/20 0804  90   95 %   01/26/20 0803  82   93 %   01/26/20 0802  72   93 %   01/26/20 0800  73   94 %   01/26/20 0759  71   94 %   01/26/20 0757  73   94 %   01/26/20 0756  70   93 %   01/26/20 0755  73   93 %   01/26/20 0754  73   93 %   01/26/20 0753  72   93 %   01/26/20 0752  69   94 %   01/26/20 0749  74   95 %   01/26/20 0748  71   93 %   01/26/20 0747  72   93 %   01/26/20 0746  74   93 %   01/26/20 0745  72   93 %   01/26/20 0743  72   94 %   01/26/20 0742  73   93 %   01/26/20 0741  76   94 %   01/26/20 0740  77   93 %   01/26/20 0739  75   93 %   01/26/20 0738  77   93 %   01/26/20 0735  77   92 %   01/26/20 0734  82   92 %   01/26/20 0733  75   93 %   01/26/20 0732  73   93 %   01/26/20 0731  74   91 %   01/26/20 0726  72   91 %   01/26/20 0725  72   91 %   01/26/20 0724  71   91 %   01/26/20 0722  73   92 %   01/26/20 0721  71   92 %   01/26/20 0720  68   94 % 01/26/20 0719  75   94 %   01/26/20 0718  74   93 %   01/26/20 0717  72   94 %   01/26/20 0715    153/89    01/26/20 0714 97.1 °F (36.2 °C) 77 20 153/89 93 %   01/26/20 0713  72   91 %   01/26/20 0712  73   91 %   01/26/20 0711  71   91 %   01/26/20 0710  75   91 %   01/26/20 0706  70   93 %   01/26/20 0705  70   93 %   01/26/20 0704  70   93 %   01/26/20 0703  73   92 %   01/26/20 0700  68   91 %   01/26/20 0659  69   92 %   01/26/20 0658  70   93 %   01/26/20 0657  68   93 %   01/26/20 0656  67   94 %   01/26/20 0650  73   92 %   01/26/20 0649  73   93 %   01/26/20 0646  69   94 %   01/26/20 0645  71   91 %   01/26/20 0644  70   94 %   01/26/20 0436 97.8 °F (36.6 °C) 87 18 (!) 153/96 97 %   01/25/20 2335 98 °F (36.7 °C) 83 18 (!) 143/91 96 %   01/25/20 1926 98 °F (36.7 °C) 88 17 157/88 97 %   01/25/20 1528  78 18 (!) 142/96    01/25/20 1440 97.6 °F (36.4 °C) 89 18 168/88 95 %   01/25/20 1340 97.6 °F (36.4 °C) 91 18 132/71 96 %       Intake/Output Summary (Last 24 hours) at 1/26/2020 1311  Last data filed at 1/26/2020 0714  Gross per 24 hour   Intake 1410 ml   Output 2150 ml   Net -740 ml        PHYSICAL EXAM:  General: WD, WN. Alert, cooperative, no acute distress    EENT:  EOMI. Anicteric sclerae. MMM  Resp:  CTA bilaterally, no wheezing or rales. No accessory muscle use  CV:  Regular  rhythm,  No edema  GI:  Soft, Non distended, Non tender.  +Bowel sounds  Neurologic:  Alert and oriented X 3, normal speech,   Psych:   Good insight. Not anxious nor agitated  Skin:  No rashes.   No jaundice    Reviewed most current lab test results and cultures  YES  Reviewed most current radiology test results   YES  Review and summation of old records today    NO  Reviewed patient's current orders and MAR    YES  PMH/SH reviewed - no change compared to H&P  ________________________________________________________________________  Care Plan discussed with: Comments   Patient x    Family      RN x    Care Manager     Consultant                        Multidiciplinary team rounds were held today with , nursing, pharmacist and clinical coordinator. Patient's plan of care was discussed; medications were reviewed and discharge planning was addressed. ________________________________________________________________________  Total NON critical care TIME:  30   Minutes    Total CRITICAL CARE TIME Spent:   Minutes non procedure based      Comments   >50% of visit spent in counseling and coordination of care     ________________________________________________________________________  Traci Valdez MD     Procedures: see electronic medical records for all procedures/Xrays and details which were not copied into this note but were reviewed prior to creation of Plan. LABS:  I reviewed today's most current labs and imaging studies.   Pertinent labs include:  Recent Labs     01/26/20  0434 01/25/20  0321 01/24/20  1630   WBC 10.1 9.7 10.2   HGB 14.4 14.1 15.6   HCT 42.4 41.5 46.9    193 201     Recent Labs     01/26/20  0434 01/25/20  0321 01/24/20  1630    141 142   K 3.9 3.8 3.7   * 111* 105   CO2 22 23 27   * 146* 196*   BUN 12 12 13   CREA 1.04 0.99 1.17   CA 8.4* 8.4* 8.8   ALB  --   --  3.1*   TBILI  --   --  0.6   SGOT  --   --  27   ALT  --   --  32       Signed: Traci Valdez MD

## 2020-01-26 NOTE — PROGRESS NOTES
Problem: Falls - Risk of  Goal: *Absence of Falls  Description  Document Kathy Martinez Fall Risk and appropriate interventions in the flowsheet.   Outcome: Progressing Towards Goal  Note: Fall Risk Interventions:            Medication Interventions: Patient to call before getting OOB, Teach patient to arise slowly                   Problem: Patient Education: Go to Patient Education Activity  Goal: Patient/Family Education  Outcome: Progressing Towards Goal     Problem: Pulmonary Embolism Care Plan (Adult)  Goal: *Improvement of existing pulmonary embolism  Outcome: Progressing Towards Goal  Goal: *Absence of bleeding  Outcome: Progressing Towards Goal  Goal: *Labs within defined limits  Outcome: Progressing Towards Goal     Problem: Hypertension  Goal: *Blood pressure within specified parameters  Outcome: Progressing Towards Goal  Goal: *Fluid volume balance  Outcome: Progressing Towards Goal  Goal: *Labs within defined limits  Outcome: Progressing Towards Goal

## 2020-01-27 VITALS
DIASTOLIC BLOOD PRESSURE: 96 MMHG | HEIGHT: 71 IN | BODY MASS INDEX: 31.7 KG/M2 | RESPIRATION RATE: 20 BRPM | OXYGEN SATURATION: 95 % | TEMPERATURE: 98.4 F | SYSTOLIC BLOOD PRESSURE: 162 MMHG | HEART RATE: 75 BPM | WEIGHT: 226.41 LBS

## 2020-01-27 LAB
ANION GAP SERPL CALC-SCNC: 7 MMOL/L (ref 5–15)
BUN SERPL-MCNC: 15 MG/DL (ref 6–20)
BUN/CREAT SERPL: 14 (ref 12–20)
CALCIUM SERPL-MCNC: 7.9 MG/DL (ref 8.5–10.1)
CHLORIDE SERPL-SCNC: 111 MMOL/L (ref 97–108)
CO2 SERPL-SCNC: 24 MMOL/L (ref 21–32)
CREAT SERPL-MCNC: 1.09 MG/DL (ref 0.7–1.3)
ERYTHROCYTE [DISTWIDTH] IN BLOOD BY AUTOMATED COUNT: 11.8 % (ref 11.5–14.5)
GLUCOSE SERPL-MCNC: 123 MG/DL (ref 65–100)
HCT VFR BLD AUTO: 42 % (ref 36.6–50.3)
HGB BLD-MCNC: 14.1 G/DL (ref 12.1–17)
MCH RBC QN AUTO: 32.7 PG (ref 26–34)
MCHC RBC AUTO-ENTMCNC: 33.6 G/DL (ref 30–36.5)
MCV RBC AUTO: 97.4 FL (ref 80–99)
NRBC # BLD: 0 K/UL (ref 0–0.01)
NRBC BLD-RTO: 0 PER 100 WBC
PLATELET # BLD AUTO: 227 K/UL (ref 150–400)
PMV BLD AUTO: 10.5 FL (ref 8.9–12.9)
POTASSIUM SERPL-SCNC: 3.9 MMOL/L (ref 3.5–5.1)
RBC # BLD AUTO: 4.31 M/UL (ref 4.1–5.7)
SODIUM SERPL-SCNC: 142 MMOL/L (ref 136–145)
WBC # BLD AUTO: 7.9 K/UL (ref 4.1–11.1)

## 2020-01-27 PROCEDURE — 74011250637 HC RX REV CODE- 250/637: Performed by: INTERNAL MEDICINE

## 2020-01-27 PROCEDURE — 36415 COLL VENOUS BLD VENIPUNCTURE: CPT

## 2020-01-27 PROCEDURE — 80048 BASIC METABOLIC PNL TOTAL CA: CPT

## 2020-01-27 PROCEDURE — 85027 COMPLETE CBC AUTOMATED: CPT

## 2020-01-27 RX ADMIN — Medication 10 ML: at 03:24

## 2020-01-27 RX ADMIN — ASPIRIN 81 MG 81 MG: 81 TABLET ORAL at 09:07

## 2020-01-27 RX ADMIN — APIXABAN 10 MG: 5 TABLET, FILM COATED ORAL at 09:07

## 2020-01-27 RX ADMIN — LISINOPRIL 10 MG: 5 TABLET ORAL at 09:07

## 2020-01-27 RX ADMIN — ATORVASTATIN CALCIUM 10 MG: 10 TABLET, FILM COATED ORAL at 09:07

## 2020-01-27 RX ADMIN — TAMSULOSIN HYDROCHLORIDE 0.4 MG: 0.4 CAPSULE ORAL at 09:07

## 2020-01-27 NOTE — PROGRESS NOTES
DISCHARGE SUMMARY FROM NURSE    The patient is stable for discharge. I have reviewed the discharge instructions with the patient. The patient verbalized understanding. All questions were fully answered. The  patient denies any complaints. There were no personal belongings, valuables or home medications left at patients bedside,  or safe. Hard scripts and medication handouts were given and reviewed with the patient. Appropriate educational materials and medication side effects teaching were provided. Cardiac monitor and IV line(s) were removed.    Clarified w/ Dr. Tameka Swanson that pt should stop taking aspirin while on Eliquis, MD will update AVS.

## 2020-01-27 NOTE — ROUTINE PROCESS
The following appointments have been successfully scheduled: 
 
Date/time Monday, February 03, 2020 10:10 AM 
Patient  Kai Reynolds [id number] ([id number] [phone] [facility] OFFICE Appointment type Any Provider Alec Hein

## 2020-01-27 NOTE — PROGRESS NOTES
Problem: Falls - Risk of  Goal: *Absence of Falls  Description  Document Darin West Fall Risk and appropriate interventions in the flowsheet.   Outcome: Progressing Towards Goal  Note: Fall Risk Interventions:            Medication Interventions: Patient to call before getting OOB, Teach patient to arise slowly                   Problem: Pulmonary Embolism Care Plan (Adult)  Goal: *Improvement of existing pulmonary embolism  Outcome: Progressing Towards Goal  Goal: *Absence of bleeding  Outcome: Progressing Towards Goal

## 2020-01-27 NOTE — PROGRESS NOTES
Discharge instructions reviewed with pt to his satisfaction. Signed copy on pt's ppr chart and original given to pt. Pt's rx sent to pharmacy. Iv/tele removed. Pt d/c'd to home with dtr.

## 2020-01-27 NOTE — PROGRESS NOTES
[facility]  Pulmonary, Critical Care, and Sleep Medicine        Name: Sandra Starkey MRN: [id number]   : 1945 Hospital: [facility]   Date: 2020        IMPRESSION:   · Bilateral Pulmonary Emboli. Hemodynamically stable. No evidence of DVT. · Cor pulmonale on echo  · Hx of Colon Carcinoma, resected per Dr. Danielle Bose, Has as ostomy in place. · Had recent kidney stone with lithotripsy. · No hx of bleeding or clotting issues in pt. No family hx of clotting issues. · Baseline Hypertension, has increased BPs. · Increased lipids  · He did travel by car around [place]. RECOMMENDATIONS:   · D/c per  on DOAC  · F/u  in a few weeks  · Pt education about bleeding risk       20: chart reviewed. D/w Dr. Lis Humphrey. Ambulating on room air. NO chest pain or dizziness. Cor pulmonale on echo- acute vs chronic or both. Never smoked. Denies snoring. Does not sleep well due to ostomy care at night. NO SOB on room air    Subjective: This patient has been seen and evaluated at the request of Dr. Lis Humphrey for above. Patient is a 75 y.o. male who had increased shortness of breath, had recent lithotripsy. Has not as active for last week. Had recently traveled by car done to [place], [place]. Had been noting increased dyspnea for about 3 days. Past Medical History:   Diagnosis Date    Cancer [facility])     colon    Controlled type 2 diabetes mellitus without complication, without long-term current use of insulin ([address] 2017    Diabetes ([address]     Hypertension       Past Surgical History:   Procedure Laterality Date    ABDOMEN SURGERY PROC UNLISTED      ileostomy    HX GI  13     REVISION / REPAIR ILEOSTOMY HERNIA    HX LITHOTRIPSY Left 2020    HX OTHER SURGICAL      vasectomy      Prior to Admission medications    Medication Sig Start Date End Date Taking?  Authorizing Provider   apixaban (ELIQUIS) 5 mg tablet Take 2 tablets twice daily for next 6 days, Then Take 1 tablet Twice daily. 1/27/20  Yes Julio Lovell MD   lisinopril (PRINIVIL, ZESTRIL) 10 mg tablet Take 1 Tab by mouth daily. 10/4/19  Yes Jada Ambrocio MD   atorvastatin (LIPITOR) 10 mg tablet Take 1 Tab by mouth daily. 5/29/19  Yes Jada Ambrocio MD   ketoconazole (NIZORAL) 2 % topical cream Daily as needed 6/20/18  Yes Alli Cee MD   aspirin 81 mg tablet Take 81 mg by mouth daily. Yes Provider, Historical   ondansetron (ZOFRAN ODT) 4 mg disintegrating tablet Take 1 Tab by mouth every eight (8) hours as needed for Nausea. 1/11/20   Tiago Sullivan MD   tamsulosin (FLOMAX) 0.4 mg capsule TAKE 1 CAPSULE BY MOUTH  DAILY 5/29/19   Jada Ambrocio MD   Blood-Glucose Meter monitoring kit Test sugars daily 11/21/18   Alli Cee MD   glucose blood VI test strips (FREESTYLE LITE STRIPS) strip Test sugar daily DX E 11.9 11/21/18   Alli Cee MD   Lancets misc Test sugar daily 11/21/18   Alli Cee MD   [facility] SENSIMIST 27.5 mcg/actuation nasal spray U 2 SPRAYS IEN ONCE D 4/28/18   Provider, Historical   loperamide (IMMODIUM) 2 mg tablet Take 4 mg by mouth three (3) times daily as needed.  And with snacks     Provider, Historical     No Known Allergies   Social History     Tobacco Use    Smoking status: Never Smoker    Smokeless tobacco: Never Used   Substance Use Topics    Alcohol use: Yes     Comment: very rare      Family History   Problem Relation Age of Onset    Alzheimer Mother     Cancer Brother         lung        Current Facility-Administered Medications   Medication Dose Route Frequency    apixaban (ELIQUIS) tablet 10 mg  10 mg Oral BID    Followed by   Tootie Soto ON 2/2/2020] apixaban (ELIQUIS) tablet 5 mg  5 mg Oral BID    sodium chloride (NS) flush 5-40 mL  5-40 mL IntraVENous Q8H    lisinopril (PRINIVIL, ZESTRIL) tablet 10 mg  10 mg Oral DAILY    atorvastatin (LIPITOR) tablet 10 mg  10 mg Oral DAILY    tamsulosin (FLOMAX) capsule 0.4 mg  0.4 mg Oral DAILY    aspirin chewable tablet 81 mg  81 mg Oral DAILY       Review of Systems:  A comprehensive review of systems was negative. Objective:   Vital Signs:    Visit Vitals  BP (!) 162/96 (BP 1 Location: Right arm, BP Patient Position: At rest)   Pulse 75   Temp 98.4 °F (36.9 °C)   Resp 20   Ht 5' 11\" (1.803 m)   Wt 102.7 kg (226 lb 6.6 oz)   SpO2 95%   BMI 31.58 kg/m²       O2 Device: Room air   O2 Flow Rate (L/min): 2 l/min   Temp (24hrs), Av °F (36.7 °C), Min:97.5 °F (36.4 °C), Max:98.8 °F (37.1 °C)       Intake/Output:   Last shift:       0701 -  1900  In: 240 [P.O.:240]  Out: -   Last 3 shifts:  190 -  0700  In: 9279 [P.O.:1290]  Out: 2325 [IEPRX:9610]    Intake/Output Summary (Last 24 hours) at 2020 1001  Last data filed at 2020 0909  Gross per 24 hour   Intake 840 ml   Output 325 ml   Net 515 ml      Physical Exam:  On NC oxgyen. BP noted. General:  Alert, cooperative, no distress, appears stated age. Head:  Normocephalic, without obvious abnormality, atraumatic. Eyes:  Conjunctivae/corneas clear. PERRL, EOMs intact. Nose: Nares normal. Septum midline. Mucosa normal. No drainage or sinus tenderness. Throat: Lips, mucosa, and tongue normal. Teeth and gums normal.   Neck: Supple, symmetrical, trachea midline, no adenopathy, thyroid: no enlargment/tenderness/nodules, no carotid bruit and no JVD. Back:   Symmetric, no curvature. ROM normal.   Lungs:   Clear to auscultation bilaterally. Chest wall:  No tenderness or deformity. Heart:  Regular rate and rhythm, S1, S2 normal, no murmur, click, rub or gallop. Abdomen:   Soft, non-tender. Bowel sounds normal. No masses,  No organomegaly. Has a well healed midline incision and RLQ ostomy. Extremities: Extremities normal, atraumatic, no cyanosis or edema. Pulses: 2+ and symmetric all extremities.    Skin: Skin color, texture, turgor normal. No rashes or lesions   Lymph nodes: Cervical, supraclavicular, and axillary nodes normal.   Neurologic: Grossly nonfocal     Data review:     Recent Results (from the past 24 hour(s))   ECHO ADULT COMPLETE    Collection Time: 01/26/20  2:02 PM   Result Value Ref Range    LV E' Septal Velocity 3.26 cm/s    Ao Root D 3.60 cm    Aortic Valve Systolic Peak Velocity 574.12 cm/s    Aortic Valve Area by Continuity of Peak Velocity 3.8 cm2    AoV PG 4.7 mmHg    LVIDd 5.15 4.2 - 5.9 cm    LVPWd 1.95 (A) 0.6 - 1.0 cm    LVIDs 3.84 cm    IVSd 1.99 (A) 0.6 - 1.0 cm    IVSs 2.26 cm    LVOT d 2.47 cm    LVOT Peak Velocity 85.09 cm/s    LVOT Peak Gradient 2.9 mmHg    MV A David 49.36 cm/s    MV E David 43.29 cm/s    MV E/A 0.88     Left Atrium to Aortic Root Ratio 1.26     LV Mass .8 (A) 88 - 224 g    LV Mass AL Index 282.0 (A) 49 - 115 g/m2    LVPWs 2.34 cm    E/E' septal 13.28     Est. RA Pressure 10.0 mmHg    Mitral Valve E Wave Deceleration Time 126.4 ms    Left Atrium Major Axis 4.53 cm    Aortic Regurgitant Pressure Half-time 395.4 cm    LISA/BSA Pk David 1.7 cm2/m2    AV Cusp 2.30 cm    AR Max David 388.00 cm/s    Left Ventricular Fractional Shortening by 2D 61.904885082 %    PV End Diastolic Velocity 1.1 mmHg    Mitral Valve Deceleration [place] 2.5636199760117     AV Velocity Ratio 0.79     PI End Diastolic Pressure 88.8 mmHg    AV peak gradient 70.135182449 mmHg    Left Ventricular End Diastolic Volume by Teichholz Method 2.88730711357 mL    Left Ventricular End Systolic Volume by Teichholz Method 8.71819378535114 mL    Left Ventricular Stroke Volume by Teichholz Method 12.292140898 mL   METABOLIC PANEL, BASIC    Collection Time: 01/27/20  3:17 AM   Result Value Ref Range    Sodium 142 136 - 145 mmol/L    Potassium 3.9 3.5 - 5.1 mmol/L    Chloride 111 (H) 97 - 108 mmol/L    CO2 24 21 - 32 mmol/L    Anion gap 7 5 - 15 mmol/L    Glucose 123 (H) 65 - 100 mg/dL    BUN 15 6 - 20 MG/DL    Creatinine 1.09 0.70 - 1.30 MG/DL    BUN/Creatinine ratio 14 12 - 20 GFR est AA >60 >60 ml/min/1.73m2    GFR est non-AA >60 >60 ml/min/1.73m2    Calcium 7.9 (L) 8.5 - 10.1 MG/DL   CBC W/O DIFF    Collection Time: 01/27/20  3:17 AM   Result Value Ref Range    WBC 7.9 4.1 - 11.1 K/uL    RBC 4.31 4.10 - 5.70 M/uL    HGB 14.1 12.1 - 17.0 g/dL    HCT 42.0 36.6 - 50.3 %    MCV 97.4 80.0 - 99.0 FL    MCH 32.7 26.0 - 34.0 PG    MCHC 33.6 30.0 - 36.5 g/dL    RDW 11.8 11.5 - 14.5 %    PLATELET 250 762 - 311 K/uL    MPV 10.5 8.9 - 12.9 FL    NRBC 0.0 0  WBC    ABSOLUTE NRBC 0.00 0.00 - 0.01 K/uL       Imaging:  I have personally reviewed the patients radiographs and have reviewed the reports:  1-24-20: FINDINGS:  The lungs are clear of mass, nodule, airspace disease or edema.     The pulmonary arteries are remarkable for massive bilateral saddle type  pulmonary emboli extending into all lobes, with the greatest burden in the left  lower lobe and right lower lobe. There are no peripheral pulmonary infarctions. There is no definite right heart strain.     There is no mediastinal or hilar adenopathy or mass. The aorta enhances normally  without evidence of aneurysm or dissection.     The visualized portions of the upper abdominal organs are normal.     IMPRESSION  IMPRESSION: Massive bilateral pulmonary emboli.          Joey Christian MD

## 2020-01-27 NOTE — PROGRESS NOTES
Reason for Admission:   Pulmonary Thromboembolism                  RUR Score:     12 Low risk             Do you (patient/family) have any concerns for transition/discharge? None                Plan for utilizing home health:   Pt has had home health in the past. Pt will not be utilizing home health at d/c. Current Advanced Directive/Advance Care Plan:  Pt is FULL code status. Pt does have an ACP on file            Transition of Care Plan:        Home  Follow-up Appointments  2nd IM Letter    CM met with pt at bedside to discuss d/c plan. Pt was alert and oriented. CM verified pt's demographics, insurance and PCP. Pt is a 74 y/o male admitted to [facility] on 1/25/2020 for pulmonary thromboembolism. Pt's PCP is Dr. Willian Cabrera. Pt sees PCP every 3 months. Pt uses [facility] on [address] [facility] for Rx. Pt resides in his own home with his significant other in an one level home with 2 KENNETH. Pt is independent with ADL's and IADL's. Pt does drive. No DME. Pt does have a walking stick that he uses. Pt has had home health in the past. No SNF or acute inpatient rehab in the past. Pt is FULL code status. Pt does not have an ACP on file. Pt's daughter Ki Coe will transport at d/c. Medicare pt has received, reviewed, and signed 2nd IM letter informing them of their right to appeal the discharge. Signed copy has been placed on pt bedside chart. CM provided pt with Eliquis 30 day free card and informed pt about Good Rx.     10:30am-  called Dr. Gurpreet Rock, pulmonologist office to schedule pt's follow-up appointment. Appointment schedule for 2/12/2020 at 1:45pm.     Care Management Interventions  PCP Verified by CM: Yes(Pt's PCP is Dr. Willian Cabrera. Pt sees PCP every 3 months. )  Palliative Care Criteria Met (RRAT>21 & CHF Dx)?: No  Mode of Transport at Discharge:  Other (see comment)(Pt's daughter Ki Coe will transport at d/c. )  Transition of Care Consult (CM Consult): (Home)  Discharge Durable Medical Equipment: No(No DME. Pt does have a walking stick that he uses. )  Physical Therapy Consult: No  Occupational Therapy Consult: No  Speech Therapy Consult: No  Current Support Network: Own Home, Other(Pt resides with in his own home with his significant other in an one level home with 2 KENNETH. P)  Confirm Follow Up Transport: Self(Pt does drive)  [address] Provided?: No  Discharge Location  Discharge Placement: Home(Home with follow-up appointments)    No further CM needs identified. CM notified pt's nurse of d/cEverardo Sewell [address]  [phone]

## 2020-01-27 NOTE — PROGRESS NOTES
0700:Bedside shift change report given to Fidencio Van (oncoming nurse) by Mukund Kaba (offgoing nurse). Report included the following information SBAR and Kardex.

## 2020-01-27 NOTE — PROGRESS NOTES
I notified Dr. Elsie Brown of cor pulmonale on echo in this patient, in case it warrants further discussion with pulmonary.

## 2020-01-27 NOTE — DISCHARGE SUMMARY
Hospitalist Discharge Summary     Patient ID:  Joe Harrison  [id number]  86 y.o.  1945 1/25/2020    PCP on record: Terrie Ruby MD    Admit date: 1/25/2020  Discharge date and time: 1/27/2020    DISCHARGE DIAGNOSIS:    Massive bilateral pulmonary emboli POA  Cor Pulmonale  Hypertension  Hyperlipidemia  BPH         CONSULTATIONS:  IP CONSULT TO PULMONOLOGY    Excerpted HPI from H&P of Aris Gomez MD:  75years old male with past medical history significant for colon cancer, DM, hypertension, hyperlipidemia was transferred from [facility] for evaluation of shortness of breath, patient been complaining from shortness of breath for 3 days denies any fever any chills any cough, patient had lithotripsy about 1 week ago and since then she been complaining from worsening shortness of breath, CTA chest was done and showed massive bilateral PE. We were asked to admit for work up and evaluation of the above problems.        ______________________________________________________________________  DISCHARGE SUMMARY/HOSPITAL COURSE:  for full details see H&P, daily progress notes, labs, consult notes. Massive bilateral pulmonary emboli POA  ? provoked  Cor pulmonale on ECHO  -Continue Lovenox, switched to eliquis  ECHO with cor pulmonale. Discussed with pulmonary due to echo finding, as pt has improved , now on room air, Hemodynamically stable. Walked 30 minutes on RA. O2 did not drop <93%. He may have untreated OMAR as well.    appreciate Pulmonary help  Doppler neg for DVT  eliquis Rx to pharmacy, his copay around 40-50, and he is okay with price     Hypertension  continue home antihypertensive medication     Hyperlipidemia  Continue Lipitor     BPH  Continue Flomax      H?o Colon cancer -resected  Has ostomy     Recent kidney stone s/p lithotripsy      _______________________________________________________________________  Patient seen and examined by me on discharge day.  Pertinent Findings:  Gen:    Not in distress  Chest: Clear lungs  CVS:   Regular rhythm. No edema  Abd:  Soft, not distended, not tender  Neuro:  Alert, oreinted x 3  _______________________________________________________________________  DISCHARGE MEDICATIONS:   Current Discharge Medication List      START taking these medications    Details   apixaban (ELIQUIS) 5 mg tablet Take 2 tablets twice daily for next 6 days, Then Take 1 tablet Twice daily. Qty: 60 Tab, Refills: 0         CONTINUE these medications which have NOT CHANGED    Details   lisinopril (PRINIVIL, ZESTRIL) 10 mg tablet Take 1 Tab by mouth daily. Qty: 90 Tab, Refills: 1    Associated Diagnoses: Essential hypertension      atorvastatin (LIPITOR) 10 mg tablet Take 1 Tab by mouth daily. Qty: 90 Tab, Refills: 3    Associated Diagnoses: Hyperlipidemia, unspecified hyperlipidemia type      ketoconazole (NIZORAL) 2 % topical cream Daily as needed  Qty: 60 g, Refills: 5      aspirin 81 mg tablet Take 81 mg by mouth daily. ondansetron (ZOFRAN ODT) 4 mg disintegrating tablet Take 1 Tab by mouth every eight (8) hours as needed for Nausea. Qty: 12 Tab, Refills: 0      tamsulosin (FLOMAX) 0.4 mg capsule TAKE 1 CAPSULE BY MOUTH  DAILY  Qty: 90 Cap, Refills: 3    Associated Diagnoses: Benign prostatic hyperplasia, unspecified whether lower urinary tract symptoms present      Blood-Glucose Meter monitoring kit Test sugars daily  Qty: 1 Kit, Refills: 0    Comments: Dx E11.9      glucose blood VI test strips (FREESTYLE LITE STRIPS) strip Test sugar daily DX E 11.9  Qty: 100 Strip, Refills: 5      Lancets misc Test sugar daily  Qty: 1 Each, Refills: 11    Comments: Dx E11.9      FLONASE SENSIMIST 27.5 mcg/actuation nasal spray U 2 SPRAYS IEN ONCE D  Refills: 0      loperamide (IMMODIUM) 2 mg tablet Take 4 mg by mouth three (3) times daily as needed. And with snacks                Patient Follow Up Instructions:    Activity: Activity as tolerated  Diet: Regular Diet    Follow-up Information     Follow up With Specialties Details Why Contact Info    Amber Hoover MD Pulmonary Disease In 2 weeks  [address]  [address], [place] [phone]          ________________________________________________________________    Risk of deterioration: Low    Condition at Discharge:  Stable  __________________________________________________________________    Disposition  Home with family, no needs    ____________________________________________________________________    Code Status: Full Code  ___________________________________________________________________      Total time in minutes spent coordinating this discharge (includes going over instructions, follow-up, prescriptions, and preparing report for sign off to her PCP) :  40 minutes    Signed:  Maida Mittal MD

## 2020-01-27 NOTE — DISCHARGE INSTRUCTIONS
HOSPITALIST DISCHARGE INSTRUCTIONS    NAME: Wilbert Hylton   :  6   MRN:  [id number]     Date/Time:  2020 9:46 AM    ADMIT DATE: 2020   DISCHARGE DATE: 2020     Massive bilateral pulmonary emboli POA  Cor Pulmonale  Hypertension  Hyperlipidemia  BPH    · It is important that you take the medication exactly as they are prescribed. · Keep your medication in the bottles provided by the pharmacist and keep a list of the medication names, dosages, and times to be taken in your wallet. · Do not take other medications without consulting your doctor. What to do at Home    Recommended diet:  Regular Diet    Recommended activity: Activity as tolerated      If you have questions regarding the hospital related prescriptions or hospital related issues please call [facility] at [phone]. You can always direct your questions to your primary care doctor if you are unable to reach your hospital physician; your PCP works as an extension of your hospital doctor just like your hospital doctor is an extension of your PCP for your time at the hospital [facility])    If you experience any of the following symptoms then please call your primary care physician or return to the emergency room if you cannot get hold of your doctor:    Fever, chills, nausea, vomiting, or persistent diarrhea  Worsening weakness or new problems with your speech or balance  Dark stools or visible blood in your stools  New Leg swelling or shortness of breath as these could be signs of a clot    Additional Instructions:      Bring these papers with you to your follow up appointments. The papers will help your doctors be sure to continue the care plan from the hospital.              Information obtained by :  I understand that if any problems occur once I am at home I am to contact my physician. I understand and acknowledge receipt of the instructions indicated above. Physician's or R.N.'s Signature                                                                  Date/Time                                                                                                                                              Patient or Representative Signature

## 2020-08-10 ENCOUNTER — OFFICE VISIT (OUTPATIENT)
Dept: PRIMARY CARE CLINIC | Age: 75
End: 2020-08-10

## 2020-08-10 DIAGNOSIS — Z20.828 EXPOSURE TO SARS-ASSOCIATED CORONAVIRUS: Primary | ICD-10-CM

## 2020-08-12 LAB — SARS-COV-2, NAA: NOT DETECTED

## 2020-08-31 RX ORDER — KETOCONAZOLE 20 MG/G
CREAM TOPICAL
Qty: 60 G | Refills: 1 | Status: SHIPPED | OUTPATIENT
Start: 2020-08-31

## 2021-04-06 ENCOUNTER — HOSPITAL ENCOUNTER (EMERGENCY)
Age: 76
Discharge: HOME | End: 2021-04-06
Attending: EMERGENCY MEDICINE
Payer: MEDICARE

## 2021-04-06 VITALS
RESPIRATION RATE: 18 BRPM | OXYGEN SATURATION: 96 % | TEMPERATURE: 98 F | BODY MASS INDEX: 31.1 KG/M2 | WEIGHT: 223 LBS | DIASTOLIC BLOOD PRESSURE: 71 MMHG | HEART RATE: 85 BPM | SYSTOLIC BLOOD PRESSURE: 161 MMHG

## 2021-04-06 DIAGNOSIS — R25.1 TREMOR, UNSPECIFIED: Primary | ICD-10-CM

## 2021-04-06 LAB
ALBUMIN SERPL-MCNC: 3.3 G/DL (ref 3.5–5)
ALBUMIN/GLOB SERPL: 1.1 {RATIO} (ref 1.1–2.2)
ALP SERPL-CCNC: 95 U/L (ref 45–117)
ALT SERPL-CCNC: 21 U/L (ref 12–78)
ANION GAP SERPL CALC-SCNC: 8 MMOL/L (ref 5–15)
AST SERPL-CCNC: 16 U/L (ref 15–37)
BASOPHILS # BLD: 0 K/UL (ref 0–0.1)
BASOPHILS NFR BLD: 0 % (ref 0–1)
BILIRUB SERPL-MCNC: 0.9 MG/DL (ref 0.2–1)
BUN SERPL-MCNC: 13 MG/DL (ref 6–20)
BUN/CREAT SERPL: 11 (ref 12–20)
CALCIUM SERPL-MCNC: 8.7 MG/DL (ref 8.5–10.1)
CHLORIDE SERPL-SCNC: 105 MMOL/L (ref 97–108)
CO2 SERPL-SCNC: 26 MMOL/L (ref 21–32)
CREAT SERPL-MCNC: 1.14 MG/DL (ref 0.7–1.3)
DIFFERENTIAL METHOD BLD: ABNORMAL
EOSINOPHIL # BLD: 0.2 K/UL (ref 0–0.4)
EOSINOPHIL NFR BLD: 2 % (ref 0–7)
ERYTHROCYTE [DISTWIDTH] IN BLOOD BY AUTOMATED COUNT: 12.4 % (ref 11.5–14.5)
GLOBULIN SER CALC-MCNC: 2.9 G/DL (ref 2–4)
GLUCOSE SERPL-MCNC: 166 MG/DL (ref 65–100)
HCT VFR BLD AUTO: 42.9 % (ref 36.6–50.3)
HGB BLD-MCNC: 14.4 G/DL (ref 12.1–17)
IMM GRANULOCYTES # BLD AUTO: 0 K/UL (ref 0–0.04)
IMM GRANULOCYTES NFR BLD AUTO: 0 % (ref 0–0.5)
LYMPHOCYTES # BLD: 0.7 K/UL (ref 0.8–3.5)
LYMPHOCYTES NFR BLD: 8 % (ref 12–49)
MAGNESIUM SERPL-MCNC: 1.9 MG/DL (ref 1.6–2.4)
MCH RBC QN AUTO: 31.9 PG (ref 26–34)
MCHC RBC AUTO-ENTMCNC: 33.6 G/DL (ref 30–36.5)
MCV RBC AUTO: 95.1 FL (ref 80–99)
MONOCYTES # BLD: 0.6 K/UL (ref 0–1)
MONOCYTES NFR BLD: 7 % (ref 5–13)
NEUTS SEG # BLD: 7 K/UL (ref 1.8–8)
NEUTS SEG NFR BLD: 83 % (ref 32–75)
NRBC # BLD: 0 K/UL (ref 0–0.01)
NRBC BLD-RTO: 0 PER 100 WBC
PLATELET # BLD AUTO: 141 K/UL (ref 150–400)
PMV BLD AUTO: 10.2 FL (ref 8.9–12.9)
POTASSIUM SERPL-SCNC: 3.8 MMOL/L (ref 3.5–5.1)
PROT SERPL-MCNC: 6.2 G/DL (ref 6.4–8.2)
RBC # BLD AUTO: 4.51 M/UL (ref 4.1–5.7)
SODIUM SERPL-SCNC: 139 MMOL/L (ref 136–145)
WBC # BLD AUTO: 8.5 K/UL (ref 4.1–11.1)

## 2021-04-06 PROCEDURE — 85025 COMPLETE CBC W/AUTO DIFF WBC: CPT

## 2021-04-06 PROCEDURE — 93005 ELECTROCARDIOGRAM TRACING: CPT

## 2021-04-06 PROCEDURE — 83735 ASSAY OF MAGNESIUM: CPT

## 2021-04-06 PROCEDURE — 36415 COLL VENOUS BLD VENIPUNCTURE: CPT

## 2021-04-06 PROCEDURE — 80053 COMPREHEN METABOLIC PANEL: CPT

## 2021-04-06 PROCEDURE — 99284 EMERGENCY DEPT VISIT MOD MDM: CPT

## 2021-04-06 NOTE — ED NOTES
1st contact with this patient:  Patient identified. Patient noted to have arrived by POV with a friend. Pt was sent by Dr. Dominguez Chua for further evaluation of \"shaking\"  Pt reports he had 3 teeth pulled yesterday and around midnight he started with dizziness. Pt reports he has been having dizziness and shaking for many years but this is the longest it has lasted. Pt reports he was put on Norco for his dental pain and did not eat yesterday but took his pain mediations, pt does report had breakfast this AM and has taken two pain pills today one at 4 am and one at 12 pm.  Pt is awake and alert. Pt noted with shaking of his upper extremities on movement only.   Pt educated on ER flow  Pt awaits provider duane

## 2021-04-06 NOTE — ED PROVIDER NOTES
EMERGENCY DEPARTMENT HISTORY AND PHYSICAL EXAM          Date: 4/6/2021  Patient Name: Georgia Vallejo    History of Presenting Illness     Chief Complaint   Patient presents with    Dizziness       History Provided By: Patient    HPI: Georgia Vallejo is a 67 y.o. male, pmhx listed below, who presents to the ED c/o dizziness and tremor. Patient reports this dizziness, unsteady feeling has occurred in the past on a regular basis, usually self resolves in less than an hour. Patient reports this time, symptoms have persisted throughout the day. Feels like he needs to hold onto things when he walks. Has tremors specially in hands and face. No history of alcohol or drug use. States he had a dental procedure with 3 teeth removed yesterday, very poor p.o. intake yesterday and minimal p.o. intake today. No fever, vomiting, diarrhea. No speech change, weakness, numbness. PCP: Nelli Arriaza MD    There are no other complaints, changes, or physical findings at this time.          Past History       Past Medical History:  Past Medical History:   Diagnosis Date    Cancer [facility]) 2009    colon    Controlled type 2 diabetes mellitus without complication, without long-term current use of insulin ([address] 7/12/2017    Diabetes ([address]     Hypertension     Pulmonary embolism on long-term anticoagulation therapy ([address] 01/2020    saddle embolism       Past Surgical History:  Past Surgical History:   Procedure Laterality Date    HX GI  2/11/13     REVISION / REPAIR ILEOSTOMY HERNIA    HX LITHOTRIPSY Left 01/21/2020    HX OTHER SURGICAL      vasectomy    [place] ABDOMEN SURGERY PROC UNLISTED  2009    ileostomy       Family History:  Family History   Problem Relation Age of Onset    Alzheimer Mother     Cancer Brother         lung       Social History:  Social History     Tobacco Use    Smoking status: Never Smoker    Smokeless tobacco: Never Used   Substance Use Topics    Alcohol use: Yes     Comment: very rare  Drug use: No       Current Outpatient Medications   Medication Sig Dispense Refill    atorvastatin (LIPITOR) 10 mg tablet TAKE 1 TABLET BY MOUTH  DAILY 90 Tab 0    lisinopriL (PRINIVIL, ZESTRIL) 40 mg tablet TAKE 1 TABLET BY MOUTH  DAILY 90 Tab 0    aspirin delayed-release 81 mg tablet Take  by mouth daily.  ketoconazole (NIZORAL) 2 % topical cream Daily as needed 60 g 1    tamsulosin (FLOMAX) 0.4 mg capsule TAKE 2 CAPSULES BY MOUTH  DAILY 180 Cap 3    apixaban (Eliquis) 5 mg tablet Take  by mouth two (2) times a day. Cant afford this medication,he has enough for this week.  Blood-Glucose Meter monitoring kit Test sugars daily 1 Kit 0    glucose blood VI test strips (FREESTYLE LITE STRIPS) strip Test sugar daily DX E 11.9 100 Strip 5    Lancets misc Test sugar daily 1 Each 11    FLONASE SENSIMIST 27.5 mcg/actuation nasal spray U 2 SPRAYS IEN ONCE D  0       Allergies:  No Known Allergies      Review of Systems   Review of Systems   Constitutional: Negative for chills and fever. HENT: Negative for ear pain. Eyes: Negative for pain. Respiratory: Negative for shortness of breath. Cardiovascular: Negative for chest pain. Gastrointestinal: Negative for abdominal pain. Genitourinary: Negative for flank pain. Musculoskeletal: Negative for back pain. Skin: Negative for rash. Neurological: Positive for dizziness. Negative for headaches. Psychiatric/Behavioral: Negative for agitation. Physical Exam     Vital Signs-Reviewed the patient's vital signs. Patient Vitals for the past 12 hrs:   Temp Pulse Resp BP SpO2   04/06/21 1508 98 °F (36.7 °C) 85 18 (!) 161/71 96 %   04/06/21 1400    (!) 153/78 97 %   04/06/21 1357 98.2 °F (36.8 °C) 87 18 (!) 175/83 98 %       Physical Exam  Vitals signs reviewed. HENT:      Head: Normocephalic and atraumatic. Mouth/Throat:      Mouth: Mucous membranes are moist.   Neck:      Musculoskeletal: Normal range of motion.    Cardiovascular: Rate and Rhythm: Normal rate and regular rhythm. Pulmonary:      Effort: Pulmonary effort is normal.      Breath sounds: Normal breath sounds. Abdominal:      Palpations: Abdomen is soft. Tenderness: There is no abdominal tenderness. Musculoskeletal: Normal range of motion. Skin:     General: Skin is warm and dry. Neurological:      Mental Status: He is alert and oriented to person, place, and time. Cranial Nerves: No cranial nerve deficit. Sensory: No sensory deficit. Motor: No weakness. Coordination: Coordination normal.      Comments: Finger-to-nose within normal limits   Psychiatric:         Mood and Affect: Mood normal.         Diagnostic Study Results     Labs -     Recent Results (from the past 12 hour(s))   METABOLIC PANEL, COMPREHENSIVE    Collection Time: 04/06/21  2:13 PM   Result Value Ref Range    Sodium 139 136 - 145 mmol/L    Potassium 3.8 3.5 - 5.1 mmol/L    Chloride 105 97 - 108 mmol/L    CO2 26 21 - 32 mmol/L    Anion gap 8 5 - 15 mmol/L    Glucose 166 (H) 65 - 100 mg/dL    BUN 13 6 - 20 MG/DL    Creatinine 1.14 0.70 - 1.30 MG/DL    BUN/Creatinine ratio 11 (L) 12 - 20      GFR est AA >60 >60 ml/min/1.73m2    GFR est non-AA >60 >60 ml/min/1.73m2    Calcium 8.7 8.5 - 10.1 MG/DL    Bilirubin, total 0.9 0.2 - 1.0 MG/DL    ALT (SGPT) 21 12 - 78 U/L    AST (SGOT) 16 15 - 37 U/L    Alk.  phosphatase 95 45 - 117 U/L    Protein, total 6.2 (L) 6.4 - 8.2 g/dL    Albumin 3.3 (L) 3.5 - 5.0 g/dL    Globulin 2.9 2.0 - 4.0 g/dL    A-G Ratio 1.1 1.1 - 2.2     CBC WITH AUTOMATED DIFF    Collection Time: 04/06/21  2:13 PM   Result Value Ref Range    WBC 8.5 4.1 - 11.1 K/uL    RBC 4.51 4.10 - 5.70 M/uL    HGB 14.4 12.1 - 17.0 g/dL    HCT 42.9 36.6 - 50.3 %    MCV 95.1 80.0 - 99.0 FL    MCH 31.9 26.0 - 34.0 PG    MCHC 33.6 30.0 - 36.5 g/dL    RDW 12.4 11.5 - 14.5 %    PLATELET 150 (L) 505 - 400 K/uL    MPV 10.2 8.9 - 12.9 FL    NRBC 0.0 0  WBC    ABSOLUTE NRBC 0.00 0.00 - 0.01 K/uL    NEUTROPHILS 83 (H) 32 - 75 %    LYMPHOCYTES 8 (L) 12 - 49 %    MONOCYTES 7 5 - 13 %    EOSINOPHILS 2 0 - 7 %    BASOPHILS 0 0 - 1 %    IMMATURE GRANULOCYTES 0 0.0 - 0.5 %    ABS. NEUTROPHILS 7.0 1.8 - 8.0 K/UL    ABS. LYMPHOCYTES 0.7 (L) 0.8 - 3.5 K/UL    ABS. MONOCYTES 0.6 0.0 - 1.0 K/UL    ABS. EOSINOPHILS 0.2 0.0 - 0.4 K/UL    ABS. BASOPHILS 0.0 0.0 - 0.1 K/UL    ABS. IMM. GRANS. 0.0 0.00 - 0.04 K/UL    DF SMEAR SCANNED     MAGNESIUM    Collection Time: 04/06/21  2:13 PM   Result Value Ref Range    Magnesium 1.9 1.6 - 2.4 mg/dL   EKG, 12 LEAD, INITIAL    Collection Time: 04/06/21  2:17 PM   Result Value Ref Range    Ventricular Rate 99 BPM    Atrial Rate 99 BPM    P-R Interval 166 ms    QRS Duration 110 ms    Q-T Interval 398 ms    QTC Calculation (Bezet) 510 ms    Calculated P Axis 57 degrees    Calculated R Axis 42 degrees    Calculated T Axis 113 degrees    Diagnosis       Normal sinus rhythm  ST & T wave abnormality, consider lateral ischemia  Abnormal ECG  When compared with ECG of 24-JAN-2020 17:18,  T wave inversion now evident in Lateral leads         Radiologic Studies -   No orders to display     CT Results  (Last 48 hours)    None        CXR Results  (Last 48 hours)    None            EKG interpretation: (Preliminary)  Rhythm: Sinus, rate 99, no ST elevation, narrow QRS  This EKG was interpreted by ED Provider Idania Landeros MD    Medical Decision Making   I am the first provider for this patient. I reviewed the vital signs, available nursing notes, past medical history, past surgical history, family history and social history. Records Reviewed: Nursing Notes and Old Medical Records    Provider Notes (Medical Decision Making):   MDM: 65-year-old male with tremulousness and feeling of unsteadiness on his feet. Benign neurological exam, no signs of CVA. No risk of withdrawal.  Will check electrolytes now. EKG okay. Initial assessment performed.  The patients presenting problems have been discussed, and they are in agreement with the care plan formulated and outlined with them. I have encouraged them to ask questions as they arise throughout their visit. PROGRESS NOTE:    Electrolytes within normal limits. Patient reassessed and heart rate has improved. Tremor noted to also have improved. Symptoms may be related to poor p.o. intake. May be anxiety component. Because symptoms have been going on for months/years, patient encouraged to follow-up with his primary care doctor. Discharge note:  Pt re-evaluated and noted to be feeling better, ready for discharge. Updated pt on all final results. Will follow up as instructed. All questions have been answered, pt voiced understanding and agreement with plan. Specific return precautions provided as well as instructions to return to the ED should sx worsen at any time. Vital signs stable for discharge. Diagnosis     Clinical Impression:   1. Tremor, unspecified            Disposition:  Discharged    Discharge Medication List as of 4/6/2021  3:08 PM            Please note, this dictation was completed with [employer], the computer voice recognition software. Quite often unanticipated grammatical, syntax, homophones, and other interpretive errors are inadvertently transcribed by the computer software. Please disregard these errors. Please excuse any errors that have escaped final proof reading.

## 2021-04-14 LAB
ATRIAL RATE: 99 BPM
CALCULATED P AXIS, ECG09: 57 DEGREES
CALCULATED R AXIS, ECG10: 42 DEGREES
CALCULATED T AXIS, ECG11: 113 DEGREES
DIAGNOSIS, 93000: NORMAL
P-R INTERVAL, ECG05: 166 MS
Q-T INTERVAL, ECG07: 398 MS
QRS DURATION, ECG06: 110 MS
QTC CALCULATION (BEZET), ECG08: 510 MS
VENTRICULAR RATE, ECG03: 99 BPM

## 2022-03-18 PROBLEM — N40.0 BENIGN PROSTATIC HYPERPLASIA WITHOUT LOWER URINARY TRACT SYMPTOMS: Status: ACTIVE | Noted: 2017-02-06

## 2022-03-19 PROBLEM — I10 ESSENTIAL HYPERTENSION: Status: ACTIVE | Noted: 2017-02-06

## 2022-03-19 PROBLEM — E11.9 CONTROLLED TYPE 2 DIABETES MELLITUS WITHOUT COMPLICATION, WITHOUT LONG-TERM CURRENT USE OF INSULIN (HCC): Status: ACTIVE | Noted: 2017-07-12

## 2022-03-19 PROBLEM — I26.99 PE (PULMONARY THROMBOEMBOLISM) (HCC): Status: ACTIVE | Noted: 2020-01-25

## 2022-03-19 PROBLEM — Z85.038 HISTORY OF COLON CANCER: Status: ACTIVE | Noted: 2017-02-06

## 2022-03-19 PROBLEM — K43.2 INCISIONAL HERNIA, WITHOUT OBSTRUCTION OR GANGRENE: Status: ACTIVE | Noted: 2017-03-08

## 2022-03-19 PROBLEM — H81.10 BPV (BENIGN POSITIONAL VERTIGO): Status: ACTIVE | Noted: 2017-07-11

## 2022-03-20 PROBLEM — R73.9 ELEVATED SERUM GLUCOSE: Status: ACTIVE | Noted: 2017-02-06

## 2022-03-20 PROBLEM — E78.5 HYPERLIPIDEMIA: Status: ACTIVE | Noted: 2017-02-06
